# Patient Record
Sex: MALE | Race: WHITE | Employment: FULL TIME | URBAN - METROPOLITAN AREA
[De-identification: names, ages, dates, MRNs, and addresses within clinical notes are randomized per-mention and may not be internally consistent; named-entity substitution may affect disease eponyms.]

---

## 2017-01-30 DIAGNOSIS — F51.01 PRIMARY INSOMNIA: ICD-10-CM

## 2017-01-30 RX ORDER — TRAZODONE HYDROCHLORIDE 50 MG/1
TABLET ORAL
Qty: 90 TABLET | Refills: 3 | Status: SHIPPED | OUTPATIENT
Start: 2017-01-30 | End: 2018-01-27 | Stop reason: SDUPTHER

## 2017-09-20 ENCOUNTER — TELEPHONE (OUTPATIENT)
Dept: FAMILY MEDICINE CLINIC | Age: 52
End: 2017-09-20

## 2017-09-28 ENCOUNTER — TELEPHONE (OUTPATIENT)
Dept: FAMILY MEDICINE CLINIC | Age: 52
End: 2017-09-28

## 2017-09-28 DIAGNOSIS — Z00.00 WELL ADULT HEALTH CHECK: Primary | ICD-10-CM

## 2017-10-01 LAB
ALBUMIN SERPL-MCNC: 3.9 G/DL (ref 3.2–5.3)
ALK PHOSPHATASE: 77 IU/L (ref 35–121)
ALT SERPL-CCNC: 66 IU/L (ref 5–59)
ANION GAP SERPL CALCULATED.3IONS-SCNC: 12 MMOL/L
AST SERPL-CCNC: 48 IU/L (ref 10–42)
AVERAGE GLUCOSE: 120 MG/DL (ref 66–114)
BILIRUB SERPL-MCNC: 0.6 MG/DL (ref 0.2–1.3)
BUN BLDV-MCNC: 15 MG/DL (ref 10–20)
CALCIUM SERPL-MCNC: 9.3 MG/DL (ref 8.7–10.8)
CHLORIDE BLD-SCNC: 106 MMOL/L (ref 95–111)
CHOLESTEROL/HDL RATIO: 3.1
CHOLESTEROL: 180 MG/DL
CO2: 24 MMOL/L (ref 21–32)
CREAT SERPL-MCNC: 0.9 MG/DL (ref 0.5–1.3)
EGFR AFRICAN AMERICAN: 108
EGFR IF NONAFRICAN AMERICAN: 89
GLUCOSE: 105 MG/DL (ref 70–100)
HBA1C MFR BLD: 5.8 % (ref 4.2–5.8)
HDLC SERPL-MCNC: 59 MG/DL (ref 40–60)
LDL CHOLESTEROL CALCULATED: 107 MG/DL
LDL/HDL RATIO: 1.8
POTASSIUM SERPL-SCNC: 4.2 MMOL/L (ref 3.5–5.4)
SODIUM BLD-SCNC: 138 MMOL/L (ref 134–147)
TOTAL PROTEIN: 6.5 G/DL (ref 5.8–8)
TRIGL SERPL-MCNC: 70 MG/DL
VLDLC SERPL CALC-MCNC: 14 MG/DL

## 2017-10-03 ENCOUNTER — OFFICE VISIT (OUTPATIENT)
Dept: FAMILY MEDICINE CLINIC | Age: 52
End: 2017-10-03
Payer: COMMERCIAL

## 2017-10-03 ENCOUNTER — TELEPHONE (OUTPATIENT)
Dept: FAMILY MEDICINE CLINIC | Age: 52
End: 2017-10-03

## 2017-10-03 VITALS
HEART RATE: 84 BPM | WEIGHT: 315 LBS | SYSTOLIC BLOOD PRESSURE: 136 MMHG | HEIGHT: 72 IN | DIASTOLIC BLOOD PRESSURE: 84 MMHG | RESPIRATION RATE: 16 BRPM | BODY MASS INDEX: 42.66 KG/M2

## 2017-10-03 DIAGNOSIS — K75.81 NASH (NONALCOHOLIC STEATOHEPATITIS): ICD-10-CM

## 2017-10-03 DIAGNOSIS — R73.01 IFG (IMPAIRED FASTING GLUCOSE): ICD-10-CM

## 2017-10-03 DIAGNOSIS — Z99.89 OSA ON CPAP: ICD-10-CM

## 2017-10-03 DIAGNOSIS — E66.01 MORBID OBESITY, UNSPECIFIED OBESITY TYPE (HCC): ICD-10-CM

## 2017-10-03 DIAGNOSIS — E78.5 HYPERLIPIDEMIA WITH TARGET LDL LESS THAN 100: ICD-10-CM

## 2017-10-03 DIAGNOSIS — F51.01 PRIMARY INSOMNIA: Primary | ICD-10-CM

## 2017-10-03 DIAGNOSIS — G47.33 OSA ON CPAP: ICD-10-CM

## 2017-10-03 DIAGNOSIS — Z12.11 SCREEN FOR COLON CANCER: ICD-10-CM

## 2017-10-03 PROCEDURE — 99214 OFFICE O/P EST MOD 30 MIN: CPT | Performed by: FAMILY MEDICINE

## 2017-10-03 ASSESSMENT — ENCOUNTER SYMPTOMS
RESPIRATORY NEGATIVE: 1
GASTROINTESTINAL NEGATIVE: 1

## 2017-10-03 ASSESSMENT — PATIENT HEALTH QUESTIONNAIRE - PHQ9
2. FEELING DOWN, DEPRESSED OR HOPELESS: 0
1. LITTLE INTEREST OR PLEASURE IN DOING THINGS: 0
SUM OF ALL RESPONSES TO PHQ QUESTIONS 1-9: 0
SUM OF ALL RESPONSES TO PHQ9 QUESTIONS 1 & 2: 0

## 2017-10-03 NOTE — MR AVS SNAPSHOT
After Visit Summary             Clover Good   10/3/2017 11:00 AM   Office Visit    Description:  Male : 1965   Provider:  Deloise Schwab, DO   Department:  [id number]              Your Follow-Up and Future Appointments         Below is a list of your follow-up and future appointments. This may not be a complete list as you may have made appointments directly with providers that we are not aware of or your providers may have made some for you. Please call your providers to confirm appointments. It is important to keep your appointments. Please bring your current insurance card, photo ID, co-pay, and all medication bottles to your appointment. If self-pay, payment is expected at the time of service. Your To-Do List     Follow-Up    Return in about 1 year (around 10/3/2018) for IFG. Information from Your Visit        Department     Name Address Phone Fax    [facility] [phone] [facility] [address] [facility] [place] [phone]      You Were Seen for:         Comments    Primary insomnia   [[id number]]         Vital Signs     Blood Pressure Pulse Respirations Height Weight Body Mass Index    136/84 (Site: Left Arm, Position: Sitting, Cuff Size: Large Adult) 84 16 6' (1.829 m) 410 lb 9.6 oz (186.2 kg) 55.69 kg/m2    Smoking Status                   Never Smoker           Additional Information about your Body Mass Index (BMI)           Your BMI as listed above is considered obese (30 or more). BMI is an estimate of body fat, calculated from your height and weight. The higher your BMI, the greater your risk of heart disease, high blood pressure, type 2 diabetes, stroke, gallstones, arthritis, sleep apnea, and certain cancers. BMI is not perfect. It may overestimate body fat in athletes and people who are more muscular.   Even a small weight loss (between 5 and 10 percent of your current weight) by decreasing your calorie intake and becoming more physically active will help lower your risk of developing or worsening diseases associated with obesity. Learn more at: [employer].uk          Instructions    You may receive a survey about your visit with us today. The feedback from our patients helps us identify what is working well and where the service to all patients can be enhanced. Thank you! Today's Medication Changes          These changes are accurate as of: 10/3/17 11:24 AM.  If you have any questions, ask your nurse or doctor. STOP taking these medications           loratadine 10 MG capsule   Commonly known as:  CLARITIN   Stopped by:  Chris Donaldson, DO               Your Current Medications Are              traZODone (DESYREL) 50 MG tablet TAKE 1 TABLET BY MOUTH NIGHTLY. Allergies           No Known Allergies         Additional Information        Basic Information     Date Of Birth Sex Race Ethnicity Preferred Language    1965 Male White Non-/Non  English      Problem List as of 10/3/2017  Date Reviewed: 10/3/2017                CAMI on CPAP    Morbid obesity ([address]    Primary insomnia    IFG (impaired fasting glucose)    Hyperlipidemia with target LDL less than 100    Renal cyst      Your Goals as of 10/3/2017 at 11:24 AM              11/3/15       Exercise    Exercise 3x per week (30 min per time)   On track      Immunizations as of 10/3/2017     Name Date    Tdap (Boostrix, Adacel) 11/3/2015      Preventive Care        Date Due    HIV screening is recommended for all people regardless of risk factors  aged 15-65 years at least once (lifetime) who have never been HIV tested.  6/16/1982    Colonoscopy 6/16/2017    Yearly Flu Vaccine (1) 9/1/2018 (Originally 9/1/2017)    Diabetes Screening 9/30/2020    Cholesterol Screening 9/30/2022    Tetanus Combination Vaccine (2 - Td) 11/3/2025            [facility] Our records indicate that you have an active [employer] account. You can view your After Visit Summary by going to [email or web address] and logging in with your [employer] username and password. If you don't have a [employer] username and password but a parent or guardian has access to your record, the parent or guardian should login with their own [employer] username and password and access your record to view the After Visit Summary. Additional Information  If you have questions, please contact the physician practice where you receive care. Remember, [employer] is NOT to be used for urgent needs. For medical emergencies, dial 911. For questions regarding your [employer] account call [phone]. If you have a clinical question, please call your doctor's office.

## 2017-10-03 NOTE — TELEPHONE ENCOUNTER
Patient checked insurance and Cologuard is covered, patient would like to complete this testing for colonoscopy screening. Form complete placed with provider for review, sign and fax. Cologuard shipping information given and explained to patient, voiced understanding.

## 2017-10-03 NOTE — PATIENT INSTRUCTIONS
You may receive a survey about your visit with us today. The feedback from our patients helps us identify what is working well and where the service to all patients can be enhanced. Thank you!     Check insurance regarding coverage for Cologuard

## 2017-10-03 NOTE — PROGRESS NOTES
Subjective:      Patient ID: Francisca Bobo is a 50 y.o. male. HPI:    Chief Complaint   Patient presents with    Annual Exam     NEEDS COLONOSCOPY    Results     Annual eval.      Doing well overall. Sleeping well on the trazodone. Compliant with CPAP, 100%. Weight is up once again, has declined bariatric referral in the past.    Wt Readings from Last 3 Encounters:   10/03/17 (!) 410 lb 9.6 oz (186.2 kg)   10/18/16 (!) 407 lb (184.6 kg)   12/15/15 (!) 393 lb 3.2 oz (178.4 kg)     Due for colonoscopy. Denies GI issues at this time. No famhx. Patient Active Problem List   Diagnosis    CAMI on CPAP    Morbid obesity ([facility] [address]    IFG (impaired fasting glucose)    Hyperlipidemia with target LDL less than 100    Renal cyst    Primary insomnia     No past surgical history on file. Prior to Admission medications    Medication Sig Start Date End Date Taking? Authorizing Provider   traZODone (DESYREL) 50 MG tablet TAKE 1 TABLET BY MOUTH NIGHTLY. 1/30/17  Yes Tl Plants, DO         Review of Systems   Constitutional: Negative. HENT: Negative. Respiratory: Negative. Cardiovascular: Negative. Gastrointestinal: Negative. Musculoskeletal: Negative. All other systems reviewed and are negative. Objective:   Physical Exam   Constitutional: He is oriented to person, place, and time. He appears well-developed and well-nourished. HENT:   Head: Normocephalic and atraumatic. Right Ear: Tympanic membrane normal.   Left Ear: Tympanic membrane normal.   Mouth/Throat: Oropharynx is clear and moist and mucous membranes are normal.   Neck: Carotid bruit is not present. Cardiovascular: Normal rate, regular rhythm and normal heart sounds. No murmur heard. Pulmonary/Chest: Effort normal and breath sounds normal.   Abdominal: Soft. Bowel sounds are normal.   Musculoskeletal: He exhibits no edema. Neurological: He is alert and oriented to person, place, and time.    Skin: Skin is warm and dry.   Psychiatric: He has a normal mood and affect. His behavior is normal.   Nursing note and vitals reviewed. Assessment:      1. Primary insomnia     2. CAMI on CPAP     3. Morbid obesity, unspecified obesity type     4. IFG (impaired fasting glucose)     5. Hyperlipidemia with target LDL less than 100     6. KELLY (nonalcoholic steatohepatitis)             Plan:      -  Chronic medical problems stable  -  Continue current medications  -  Labs reviewed, look fine  -  Cologuard form completed and faxed  -  Discussed bariatric, pt declines at this time  -  Stressed exercise, wt loss  -  RTO annually    Fernando received counseling on the following healthy behaviors: nutrition and exercise    Patient given educational materials on Nutrition and Exercise    I have instructed Fernando to complete a self tracking handout on Weights and instructed them to bring it with them to his next appointment. Discussed use, benefit, and side effects of prescribed medications. Barriers to medication compliance addressed. All patient questions answered. Pt voiced understanding.

## 2017-10-05 ENCOUNTER — TELEPHONE (OUTPATIENT)
Dept: FAMILY MEDICINE CLINIC | Age: 52
End: 2017-10-05

## 2017-10-10 ENCOUNTER — TELEPHONE (OUTPATIENT)
Dept: FAMILY MEDICINE CLINIC | Age: 52
End: 2017-10-10

## 2017-11-03 ENCOUNTER — TELEPHONE (OUTPATIENT)
Dept: FAMILY MEDICINE CLINIC | Age: 52
End: 2017-11-03

## 2017-11-03 NOTE — TELEPHONE ENCOUNTER
11/3/17  Patient requesting results from Cologuard testing as per  My chart, they couldn't release.   Thanks/blm

## 2018-01-27 DIAGNOSIS — F51.01 PRIMARY INSOMNIA: ICD-10-CM

## 2018-01-29 RX ORDER — TRAZODONE HYDROCHLORIDE 50 MG/1
TABLET ORAL
Qty: 90 TABLET | Refills: 3 | Status: SHIPPED | OUTPATIENT
Start: 2018-01-29 | End: 2019-01-24 | Stop reason: SDUPTHER

## 2018-02-19 ENCOUNTER — TELEPHONE (OUTPATIENT)
Dept: FAMILY MEDICINE CLINIC | Age: 53
End: 2018-02-19

## 2018-02-20 ENCOUNTER — OFFICE VISIT (OUTPATIENT)
Dept: FAMILY MEDICINE CLINIC | Age: 53
End: 2018-02-20
Payer: COMMERCIAL

## 2018-02-20 VITALS
HEART RATE: 92 BPM | HEIGHT: 72 IN | OXYGEN SATURATION: 97 % | SYSTOLIC BLOOD PRESSURE: 138 MMHG | WEIGHT: 315 LBS | DIASTOLIC BLOOD PRESSURE: 72 MMHG | TEMPERATURE: 98.2 F | RESPIRATION RATE: 24 BRPM | BODY MASS INDEX: 42.66 KG/M2

## 2018-02-20 DIAGNOSIS — J01.90 ACUTE RHINOSINUSITIS: Primary | ICD-10-CM

## 2018-02-20 PROCEDURE — 99213 OFFICE O/P EST LOW 20 MIN: CPT | Performed by: FAMILY MEDICINE

## 2018-02-20 RX ORDER — AMOXICILLIN AND CLAVULANATE POTASSIUM 875; 125 MG/1; MG/1
1 TABLET, FILM COATED ORAL 2 TIMES DAILY
Qty: 14 TABLET | Refills: 0 | Status: SHIPPED | OUTPATIENT
Start: 2018-02-20 | End: 2018-02-27

## 2018-02-20 ASSESSMENT — ENCOUNTER SYMPTOMS
SINUS PRESSURE: 1
RESPIRATORY NEGATIVE: 1
GASTROINTESTINAL NEGATIVE: 1
SINUS PAIN: 1

## 2018-02-20 NOTE — PROGRESS NOTES
Visit Information    Have you changed or started any medications since your last visit including any over-the-counter medicines, vitamins, or herbal medicines? no   Are you having any side effects from any of your medications? -  no  Have you stopped taking any of your medications? Is so, why? -  no    Have you seen any other physician or provider since your last visit? No  Have you had any other diagnostic tests since your last visit? No  Have you been seen in the emergency room and/or had an admission to a hospital since we last saw you? No  Have you had your routine dental cleaning in the past 6 months? yes - Dr Jesse Carmona    Have you activated your [employer] account? If not, what are your barriers?  Yes     Patient Care Team:  Diane Chu DO as PCP - General    Medical History Review  Past Medical, Family, and Social History reviewed and does not contribute to the patient presenting condition    Health Maintenance   Topic Date Due    HIV screen  06/16/1982    Flu vaccine (1) 09/01/2018 (Originally 9/1/2017)    A1C test (Diabetic or Prediabetic)  09/30/2018    Colon cancer screen fecal DNA test (Cologuard)  10/19/2020    Lipid screen  09/30/2022    DTaP/Tdap/Td vaccine (2 - Td) 11/03/2025

## 2018-02-20 NOTE — PROGRESS NOTES
Subjective:      Patient ID: Richard Yost is a 50 y.o. male. HPI:    Chief Complaint   Patient presents with    Facial Pain     x 10-14 days     Pt here for sinus pressure and congestion for the last 2 weeks. Now settling in the left maxillary sinus. A lot of pressure and now swelling. No fevers. Pos teeth pain. No cough, does have ST. Nothing comes out when blows nose. Patient Active Problem List   Diagnosis    CAMI on CPAP    Morbid obesity ([address]    IFG (impaired fasting glucose)    Hyperlipidemia with target LDL less than 100    Renal cyst    Primary insomnia     History reviewed. No pertinent surgical history. Prior to Admission medications    Medication Sig Start Date End Date Taking? Authorizing Provider   traZODone (DESYREL) 50 MG tablet TAKE 1 TABLET BY MOUTH NIGHTLY. 1/29/18  Yes Reg Dariela, DO         Review of Systems   Constitutional: Negative. Negative for fever. HENT: Positive for congestion, sinus pain and sinus pressure. Respiratory: Negative. Cardiovascular: Negative. Gastrointestinal: Negative. Musculoskeletal: Negative. All other systems reviewed and are negative. Objective:   Physical Exam   Constitutional: He is oriented to person, place, and time. He appears well-developed and well-nourished. HENT:   Head: Normocephalic and atraumatic. Right Ear: A middle ear effusion is present. Left Ear: A middle ear effusion is present. Nose: Mucosal edema, rhinorrhea and septal deviation present. Left sinus exhibits maxillary sinus tenderness. Mouth/Throat: Oropharynx is clear and moist and mucous membranes are normal.   Cardiovascular: Normal rate, regular rhythm and normal heart sounds. No murmur heard. Pulmonary/Chest: Effort normal and breath sounds normal.   Abdominal: Soft. Bowel sounds are normal.   Musculoskeletal: He exhibits no edema. Neurological: He is alert and oriented to person, place, and time. Skin: Skin is warm and dry. Psychiatric: He has a normal mood and affect. His behavior is normal.   Nursing note and vitals reviewed. Assessment:      1.  Acute rhinosinusitis  amoxicillin-clavulanate (AUGMENTIN) 875-125 MG per tablet           Plan:      -  Orders above  -  OTC Mucinex-D and Afrin NS  -  RTO prn

## 2018-09-10 ENCOUNTER — TELEPHONE (OUTPATIENT)
Dept: FAMILY MEDICINE CLINIC | Age: 53
End: 2018-09-10

## 2018-09-10 DIAGNOSIS — E78.5 HYPERLIPIDEMIA WITH TARGET LDL LESS THAN 100: ICD-10-CM

## 2018-09-10 DIAGNOSIS — R73.01 IFG (IMPAIRED FASTING GLUCOSE): Primary | ICD-10-CM

## 2018-09-22 LAB
ALBUMIN SERPL-MCNC: 4.3 G/DL (ref 3.5–5.2)
ALK PHOSPHATASE: 81 U/L (ref 39–118)
ALT SERPL-CCNC: 74 U/L (ref 5–50)
ANION GAP SERPL CALCULATED.3IONS-SCNC: 14 MEQ/L (ref 10–19)
AST SERPL-CCNC: 73 U/L (ref 9–50)
AVERAGE GLUCOSE: 126 MG/DL (ref 66–114)
AVERAGE GLUCOSE: NORMAL
BILIRUB SERPL-MCNC: 0.6 MG/DL
BUN BLDV-MCNC: 18 MG/DL (ref 8–23)
CALCIUM SERPL-MCNC: 9.3 MG/DL (ref 8.5–10.5)
CHLORIDE BLD-SCNC: 105 MEQ/L (ref 95–107)
CHOLESTEROL, TOTAL: 181 MG/DL
CHOLESTEROL/HDL RATIO: 3.5
CHOLESTEROL/HDL RATIO: 3.5
CHOLESTEROL: 181 MG/DL
CO2: 21 MEQ/L (ref 19–31)
CREAT SERPL-MCNC: 1 MG/DL (ref 0.8–1.4)
EGFR AFRICAN AMERICAN: 100.6 ML/MIN/1.73 M2
EGFR IF NONAFRICAN AMERICAN: 86.8 ML/MIN/1.73 M2
GLUCOSE: 111 MG/DL (ref 70–99)
HBA1C MFR BLD: 6 %
HBA1C MFR BLD: 6 % (ref 4.2–5.8)
HDLC SERPL-MCNC: 51 MG/DL (ref 35–70)
HDLC SERPL-MCNC: 51.4 MG/DL
LDL CHOLESTEROL CALCULATED: 112 MG/DL
LDL CHOLESTEROL CALCULATED: 112 MG/DL (ref 0–160)
LDL/HDL RATIO: 2.2
POTASSIUM SERPL-SCNC: 4.1 MEQ/L (ref 3.5–5.4)
SODIUM BLD-SCNC: 140 MEQ/L (ref 135–146)
TOTAL PROTEIN: 7.1 G/DL (ref 6.1–8.3)
TRIGL SERPL-MCNC: 89 MG/DL
TRIGL SERPL-MCNC: 89 MG/DL
TSH SERPL DL<=0.05 MIU/L-ACNC: 1.04 UIU/ML
TSH SERPL DL<=0.05 MIU/L-ACNC: 1.04 UIU/ML (ref 0.4–4.1)
VLDLC SERPL CALC-MCNC: 18 MG/DL
VLDLC SERPL CALC-MCNC: 18 MG/DL

## 2018-09-25 ENCOUNTER — OFFICE VISIT (OUTPATIENT)
Dept: FAMILY MEDICINE CLINIC | Age: 53
End: 2018-09-25
Payer: COMMERCIAL

## 2018-09-25 VITALS
BODY MASS INDEX: 44.1 KG/M2 | WEIGHT: 315 LBS | DIASTOLIC BLOOD PRESSURE: 74 MMHG | SYSTOLIC BLOOD PRESSURE: 126 MMHG | HEART RATE: 55 BPM | HEIGHT: 71 IN | RESPIRATION RATE: 15 BRPM | OXYGEN SATURATION: 98 %

## 2018-09-25 DIAGNOSIS — M54.9 CHRONIC RIGHT-SIDED BACK PAIN, UNSPECIFIED BACK LOCATION: ICD-10-CM

## 2018-09-25 DIAGNOSIS — G47.33 OSA ON CPAP: ICD-10-CM

## 2018-09-25 DIAGNOSIS — R73.01 IFG (IMPAIRED FASTING GLUCOSE): Primary | ICD-10-CM

## 2018-09-25 DIAGNOSIS — M22.42 CHONDROMALACIA OF BOTH PATELLAE: ICD-10-CM

## 2018-09-25 DIAGNOSIS — F51.01 PRIMARY INSOMNIA: ICD-10-CM

## 2018-09-25 DIAGNOSIS — M22.41 CHONDROMALACIA OF BOTH PATELLAE: ICD-10-CM

## 2018-09-25 DIAGNOSIS — Z99.89 OSA ON CPAP: ICD-10-CM

## 2018-09-25 DIAGNOSIS — M21.42 PES PLANUS OF BOTH FEET: ICD-10-CM

## 2018-09-25 DIAGNOSIS — E78.5 HYPERLIPIDEMIA WITH TARGET LDL LESS THAN 100: ICD-10-CM

## 2018-09-25 DIAGNOSIS — G89.29 CHRONIC RIGHT-SIDED BACK PAIN, UNSPECIFIED BACK LOCATION: ICD-10-CM

## 2018-09-25 DIAGNOSIS — M21.41 PES PLANUS OF BOTH FEET: ICD-10-CM

## 2018-09-25 DIAGNOSIS — E66.01 MORBID OBESITY (HCC): ICD-10-CM

## 2018-09-25 PROCEDURE — 99214 OFFICE O/P EST MOD 30 MIN: CPT | Performed by: FAMILY MEDICINE

## 2018-09-25 RX ORDER — TRAZODONE HYDROCHLORIDE 50 MG/1
TABLET ORAL
Qty: 90 TABLET | Refills: 3 | Status: CANCELLED | OUTPATIENT
Start: 2018-09-25

## 2018-09-25 ASSESSMENT — ENCOUNTER SYMPTOMS
GASTROINTESTINAL NEGATIVE: 1
RESPIRATORY NEGATIVE: 1
BACK PAIN: 1

## 2018-09-25 ASSESSMENT — PATIENT HEALTH QUESTIONNAIRE - PHQ9
SUM OF ALL RESPONSES TO PHQ QUESTIONS 1-9: 0
SUM OF ALL RESPONSES TO PHQ9 QUESTIONS 1 & 2: 0
1. LITTLE INTEREST OR PLEASURE IN DOING THINGS: 0
2. FEELING DOWN, DEPRESSED OR HOPELESS: 0
SUM OF ALL RESPONSES TO PHQ QUESTIONS 1-9: 0

## 2018-09-25 NOTE — PATIENT INSTRUCTIONS
You may receive a survey about your visit with us today. The feedback from our patients helps us identify what is working well and where the service to all patients can be enhanced. Thank you! Patient Education        Patellofemoral Pain Syndrome: Care Instructions  Your Care Instructions  Patellofemoral pain syndrome is pain in the front of the knee. It is caused by overuse, weak thigh muscles (quadriceps), or a problem with the way the kneecap moves. Extra weight may also cause this syndrome. The patella is the kneecap, and the femur is the thighbone. Some people may have pain in the front of the knee from a condition called chondromalacia. In this problem, the underside of the knee cartilage wears down and frays. Cartilage is a rubbery tissue that cushions joints. In some cases, the kneecap does not move, or track, in a normal way. You may have knee pain when you run, walk down hills or steps, or do another activity. Sitting for a long time also can cause knee pain. Your knee pain may get better with medicines for pain and swelling and exercises to make your quadriceps stronger. Losing weight, if you need to, may also help with pain. Follow-up care is a key part of your treatment and safety. Be sure to make and go to all appointments, and call your doctor if you are having problems. It's also a good idea to know your test results and keep a list of the medicines you take. How can you care for yourself at home? · Ask your doctor if you can take an over-the-counter pain medicine, such as acetaminophen (Tylenol), ibuprofen (Advil, Motrin), or naproxen (Aleve). Be safe with medicines. Read and follow all instructions on the label. · Rest and protect your knee. Take a break from activities that cause pain, such as long periods of sitting or kneeling. · Put ice or a cold pack on your knee for 10 to 20 minutes after activity. Put a thin cloth between the ice and your skin.   · If your doctor recommends an elastic bandage, sleeve, or other type of support for your knee, wear it as directed. · If your knee is not swollen, you can put moist heat, a heating pad, or a warm cloth on your knee. After several days of rest, you can begin gentle exercise of your knee. · Reach and stay at a healthy weight. Being overweight puts stress on your knees. · Wear athletic shoes that offer good support, especially if you run. · Use shoe inserts, or orthotics, if they help reduce your knee pain. Many drugseasy2comply ([employer]) and shoe stores sell them. · See a physical therapist to learn more exercises and stretches to make your legs stronger. When should you call for help? Watch closely for changes in your health, and be sure to contact your doctor if:    · Your knee pain does not get better or gets worse. Where can you learn more? Go to https://Intcomexpe9+eb.Writer's Bloq. org and sign in to your [employer] account. Enter B345 in the [employer] box to learn more about \"Patellofemoral Pain Syndrome: Care Instructions. \"     If you do not have an account, please click on the \"Sign Up Now\" link. Current as of: November 29, 2017  Content Version: 11.7  © 0213-1539 [employer]. Care instructions adapted under license by [employer] ([facility]). If you have questions about a medical condition or this instruction, always ask your healthcare professional. [place] [address] any warranty or liability for your use of this information. Patient Education        Patellofemoral Pain Syndrome (Runner's Knee): Exercises  Your Care Instructions  Here are some examples of typical rehabilitation exercises for your condition. Start each exercise slowly. Ease off the exercise if you start to have pain. Your doctor or physical therapist will tell you when you can start these exercises and which ones will work best for you.   How to do the exercises  Calf wall stretch    1. Stand facing a wall with your hands on the wall at leg.  9. Hold the stretch for at least 15 to 30 seconds. Or even better, hold the stretch for 1 minute if you can. 10. Repeat 2 to 4 times. Quad sets    1. Sit with your affected leg straight and supported on the floor or a firm bed. Place a small, rolled-up towel under your affected knee. Your other leg should be bent, with that foot flat on the floor. 2. Tighten the thigh muscles of your affected leg by pressing the back of your knee down into the towel. 3. Hold for about 6 seconds, then rest for up to 10 seconds. 4. Repeat 8 to 12 times. Straight-leg raises to the front    1. Lie on your back with your good knee bent so that your foot rests flat on the floor. Your affected leg should be straight. Make sure that your low back has a normal curve. You should be able to slip your hand in between the floor and the small of your back, with your palm touching the floor and your back touching the back of your hand. 2. Tighten the thigh muscles in your affected leg by pressing the back of your knee flat down to the floor. Hold your knee straight. 3. Keeping the thigh muscles tight and your leg straight, lift your affected leg up so that your heel is about 12 inches off the floor. 4. Hold for about 6 seconds, then lower your leg slowly. Rest for up to 10 seconds between repetitions. 5. Repeat 8 to 12 times. Straight-leg raises to the back    1. Lie on your stomach, and lift your leg straight up behind you (toward the ceiling). 2. Lift your toes about 6 inches off the floor, hold for about 6 seconds, then lower slowly. 3. Do 8 to 12 repetitions. Wall slide with ball squeeze    1. Stand with your back against a wall and with your feet about shoulder-width apart. Your feet should be about 12 inches away from the wall. 2. Put a ball about the size of a soccer ball between your knees. Then slowly slide down the wall until your knees are bent about 20 to 30 degrees.   3. Tighten your thigh muscles by squeezing the ball between your knees. Hold that position for about 10 seconds, then stop squeezing. Rest for up to 10 seconds between repetitions. 4. Repeat 8 to 12 times. Follow-up care is a key part of your treatment and safety. Be sure to make and go to all appointments, and call your doctor if you are having problems. It's also a good idea to know your test results and keep a list of the medicines you take. Where can you learn more? Go to https://Silent Communication.HealthHiway. org and sign in to your [employer] account. Enter A404 in the [employer] box to learn more about \"Patellofemoral Pain Syndrome (Runner's Knee): Exercises. \"     If you do not have an account, please click on the \"Sign Up Now\" link. Current as of: November 29, 2017  Content Version: 11.7  © 4475-7431 [employer]. Care instructions adapted under license by [facility] ([facility]). If you have questions about a medical condition or this instruction, always ask your healthcare professional. [address] any warranty or liability for your use of this information.

## 2018-09-25 NOTE — PROGRESS NOTES
Subjective:      Patient ID: Claudine Olmstead is a 48 y.o. male. HPI:    Chief Complaint   Patient presents with    Annual Exam    Insomnia    Back Pain     mid back patient bent over to  object from floor felt a \"pop\" mid-back pain right side radiates into rib area present for 6 months     Knee Pain     right side twisted knee stepped on his cat 2 month ago    [employer] Other     DOT will be faxing form to fill out in regards to Trazodone use      Annual eval.  Some concerns today. Pt states that he recently injured his mid-back, all he did was twist around and felt a \"pop\". Pt did this 6 mos ago. Any movement bothers him, will swell up at times. Motrin with little relief. No other conservative treatment since then. Pt also c/o right knee pain for the last 2 weeks. Twisted and felt a \"pop\" at that time. Believes that the kneecap popped out but now back into place. Still has medial knee pain. Motrin helps. No swelling. No locking or giving out. BP well controlled. BP Readings from Last 3 Encounters:   09/25/18 126/74   02/20/18 138/72   10/03/17 136/84     Weight unfortunately is back up. Wt Readings from Last 3 Encounters:   09/25/18 (!) 402 lb 9.6 oz (182.6 kg)   02/20/18 (!) 391 lb 12.8 oz (177.7 kg)   10/03/17 (!) 410 lb 9.6 oz (186.2 kg)     Sleeping well with the trazodone. Hx of CAMI, 100% on CPAP. Here to review labs. LFTs elevated. No US. Hep screen in past neg. Patient Active Problem List   Diagnosis    CAMI on CPAP    Morbid obesity ([facility] [address]    IFG (impaired fasting glucose)    Hyperlipidemia with target LDL less than 100    Renal cyst    Primary insomnia     No past surgical history on file. Prior to Admission medications    Medication Sig Start Date End Date Taking? Authorizing Provider   traZODone (DESYREL) 50 MG tablet TAKE 1 TABLET BY MOUTH NIGHTLY. 1/29/18  Yes [place], DO         Review of Systems   Constitutional: Negative. HENT: Negative. Respiratory: Negative. Cardiovascular: Negative. Gastrointestinal: Negative. Musculoskeletal: Positive for arthralgias (right knee pain) and back pain. All other systems reviewed and are negative. Objective:   Physical Exam   Constitutional: He is oriented to person, place, and time. He appears well-developed and well-nourished. HENT:   Head: Normocephalic and atraumatic. Right Ear: Tympanic membrane normal.   Left Ear: Tympanic membrane normal.   Mouth/Throat: Oropharynx is clear and moist and mucous membranes are normal.   Neck: Carotid bruit is not present. Cardiovascular: Normal rate, regular rhythm and normal heart sounds. No murmur heard. Pulmonary/Chest: Effort normal and breath sounds normal.   Abdominal: Soft. Bowel sounds are normal.   Musculoskeletal: He exhibits no edema. Right knee: He exhibits abnormal patellar mobility. He exhibits normal range of motion, no swelling, no effusion and normal meniscus. No tenderness found. No medial joint line and no lateral joint line tenderness noted. Thoracic back: He exhibits tenderness and pain. He exhibits no bony tenderness and no spasm. Back:         Feet:    Neurological: He is alert and oriented to person, place, and time. Skin: Skin is warm and dry. Psychiatric: He has a normal mood and affect. His behavior is normal.   Nursing note and vitals reviewed. Assessment:       Diagnosis Orders   1. IFG (impaired fasting glucose)     2. Primary insomnia     3. Morbid obesity ([address]     4. CAMI on CPAP     5. Hyperlipidemia with target LDL less than 100     6. Chondromalacia of both patellae     7. Pes planus of both feet  External Referral To Podiatry   8.  Chronic right-sided back pain, unspecified back location             Plan:      -  Chronic medical problems stable  -  Continue current medications  -  Labs reviewed, look ok overall  -  LFTs once again elevated, hepatitis panel neg in past  -  KELLY likely, weight loss discussed  -  Also discussed bariatric surgery, declines  -  Home exercises for #6  -  Refer to Dr. Angelo Esqueda for #7  -  Motrin prn for #8  -  RTO annually        Vivica Patient, DO

## 2019-01-24 DIAGNOSIS — F51.01 PRIMARY INSOMNIA: ICD-10-CM

## 2019-01-24 RX ORDER — TRAZODONE HYDROCHLORIDE 50 MG/1
TABLET ORAL
Qty: 90 TABLET | Refills: 3 | Status: SHIPPED | OUTPATIENT
Start: 2019-01-24 | End: 2019-09-12

## 2019-09-04 ENCOUNTER — TELEPHONE (OUTPATIENT)
Dept: FAMILY MEDICINE CLINIC | Age: 54
End: 2019-09-04

## 2019-09-04 DIAGNOSIS — Z00.00 WELL ADULT HEALTH CHECK: Primary | ICD-10-CM

## 2019-09-12 ENCOUNTER — OFFICE VISIT (OUTPATIENT)
Dept: FAMILY MEDICINE CLINIC | Age: 54
End: 2019-09-12
Payer: COMMERCIAL

## 2019-09-12 VITALS
HEART RATE: 80 BPM | WEIGHT: 315 LBS | OXYGEN SATURATION: 98 % | HEIGHT: 72 IN | BODY MASS INDEX: 42.66 KG/M2 | DIASTOLIC BLOOD PRESSURE: 82 MMHG | RESPIRATION RATE: 16 BRPM | SYSTOLIC BLOOD PRESSURE: 132 MMHG

## 2019-09-12 DIAGNOSIS — Z00.00 WELL ADULT HEALTH CHECK: Primary | ICD-10-CM

## 2019-09-12 LAB
ABSOLUTE BASO #: 0.1 X10E9/L (ref 0–0.9)
ABSOLUTE EOS #: 0.2 X10E9/L (ref 0–0.4)
ABSOLUTE LYMPH #: 2.4 X10E9/L (ref 1–3.5)
ABSOLUTE MONO #: 0.6 X10E9/L (ref 0–0.9)
ABSOLUTE NEUT #: 3.3 X10E9/L (ref 1.5–6.6)
ALBUMIN SERPL-MCNC: 4 G/DL (ref 3.2–5.3)
ALK PHOSPHATASE: 92 U/L (ref 39–130)
ALT SERPL-CCNC: 68 U/L (ref 0–40)
ANION GAP SERPL CALCULATED.3IONS-SCNC: 13 MMOL/L (ref 4–12)
AST SERPL-CCNC: 57 U/L (ref 0–41)
AVERAGE GLUCOSE: 126 MG/DL
AVERAGE GLUCOSE: NORMAL
BASOPHILS RELATIVE PERCENT: 1 %
BILIRUB SERPL-MCNC: 1 MG/DL (ref 0.3–1.2)
BUN BLDV-MCNC: 18 MG/DL (ref 5–23)
CALCIUM SERPL-MCNC: 9.2 MG/DL (ref 8.5–10.5)
CHLORIDE BLD-SCNC: 106 MMOL/L (ref 98–109)
CHOLESTEROL, TOTAL: 195 MG/DL
CHOLESTEROL/HDL RATIO: 3.5 (ref 1–5)
CHOLESTEROL/HDL RATIO: NORMAL
CHOLESTEROL: 195 MG/DL (ref 150–200)
CO2: 24 MMOL/L (ref 22–32)
CREAT SERPL-MCNC: 1.01 MG/DL (ref 0.6–1.3)
EGFR AFRICAN AMERICAN: >60 ML/MIN/1.73SQ.M
EGFR IF NONAFRICAN AMERICAN: >60 ML/MIN/1.73SQ.M
EOSINOPHILS RELATIVE PERCENT: 2.8 %
GLUCOSE: 133 MG/DL (ref 65–99)
HBA1C MFR BLD: 6 %
HBA1C MFR BLD: 6 % (ref 4.4–6.4)
HCT VFR BLD CALC: 46.3 % (ref 39–49)
HDLC SERPL-MCNC: 55 MG/DL
HDLC SERPL-MCNC: 55 MG/DL (ref 35–70)
HEMOGLOBIN: 15.5 G/DL (ref 13.1–17.3)
LDL CHOLESTEROL CALCULATED: 122 MG/DL
LDL CHOLESTEROL CALCULATED: 122 MG/DL (ref 0–160)
LDL/HDL RATIO: 2.2
LYMPHOCYTE %: 36.9 %
MCH RBC QN AUTO: 29.3 PG (ref 27–35)
MCHC RBC AUTO-ENTMCNC: 33.4 G/DL (ref 32–36)
MCV RBC AUTO: 88 FL (ref 81–101)
MONOCYTES # BLD: 9 %
NEUTROPHILS RELATIVE PERCENT: 50.3 %
PDW BLD-RTO: 14.2 % (ref 11.4–14.3)
PLATELETS: 185 X10E9/L (ref 150–450)
PMV BLD AUTO: 10 FL (ref 7–12)
POTASSIUM SERPL-SCNC: 3.8 MMOL/L (ref 3.5–5)
PSA, ULTRASENSITIVE: 0.56 NG/ML (ref 0–4)
RBC: 5.28 X10E12/L (ref 4.25–5.65)
SODIUM BLD-SCNC: 143 MMOL/L (ref 134–146)
TOTAL PROTEIN: 6.5 G/DL (ref 6–8)
TRIGL SERPL-MCNC: 89 MG/DL
TRIGL SERPL-MCNC: 89 MG/DL (ref 27–150)
TSH SERPL DL<=0.05 MIU/L-ACNC: 1.65 UIU/ML
TSH SERPL DL<=0.05 MIU/L-ACNC: 1.65 UIU/ML (ref 0.49–4.67)
VLDLC SERPL CALC-MCNC: 18 MG/DL (ref 0–30)
VLDLC SERPL CALC-MCNC: NORMAL MG/DL
WBC: 6.6 X10E9/L (ref 4.8–10.8)

## 2019-09-12 PROCEDURE — 99396 PREV VISIT EST AGE 40-64: CPT | Performed by: FAMILY MEDICINE

## 2019-09-12 ASSESSMENT — PATIENT HEALTH QUESTIONNAIRE - PHQ9
1. LITTLE INTEREST OR PLEASURE IN DOING THINGS: 0
2. FEELING DOWN, DEPRESSED OR HOPELESS: 0
SUM OF ALL RESPONSES TO PHQ QUESTIONS 1-9: 0
SUM OF ALL RESPONSES TO PHQ9 QUESTIONS 1 & 2: 0
SUM OF ALL RESPONSES TO PHQ QUESTIONS 1-9: 0

## 2019-09-12 ASSESSMENT — ENCOUNTER SYMPTOMS
GASTROINTESTINAL NEGATIVE: 1
RESPIRATORY NEGATIVE: 1

## 2020-08-11 ENCOUNTER — TELEPHONE (OUTPATIENT)
Dept: FAMILY MEDICINE CLINIC | Age: 55
End: 2020-08-11

## 2020-08-11 NOTE — TELEPHONE ENCOUNTER
Pt is scheduled 09/11 for a yearly physical. Pt would like to have labs done prior to appointment. Please advise.

## 2020-08-11 NOTE — TELEPHONE ENCOUNTER
Pt was notified and lab orders were faxed to [facility] [employer] per request.

## 2020-09-10 LAB
ABSOLUTE BASO #: 0 X10E9/L (ref 0–0.2)
ABSOLUTE EOS #: 0.2 X10E9/L (ref 0–0.4)
ABSOLUTE LYMPH #: 3.1 X10E9/L (ref 1–3.5)
ABSOLUTE MONO #: 0.6 X10E9/L (ref 0–0.9)
ABSOLUTE NEUT #: 2.8 X10E9/L (ref 1.5–6.6)
ALBUMIN SERPL-MCNC: 4 G/DL (ref 3.2–5.3)
ALK PHOSPHATASE: 85 U/L (ref 39–130)
ALT SERPL-CCNC: 60 U/L (ref 0–40)
ANION GAP SERPL CALCULATED.3IONS-SCNC: 9 MMOL/L (ref 5–15)
AST SERPL-CCNC: 59 U/L (ref 0–41)
AVERAGE GLUCOSE: 128 MG/DL
BASOPHILS RELATIVE PERCENT: 0.5 %
BILIRUB SERPL-MCNC: 0.7 MG/DL (ref 0.3–1.2)
BUN BLDV-MCNC: 22 MG/DL (ref 5–23)
CALCIUM SERPL-MCNC: 9.3 MG/DL (ref 8.5–10.5)
CHLORIDE BLD-SCNC: 108 MMOL/L (ref 98–109)
CHOLESTEROL/HDL RATIO: 4.1 (ref 1–5)
CHOLESTEROL: 196 MG/DL (ref 150–200)
CO2: 23 MMOL/L (ref 22–32)
CREAT SERPL-MCNC: 1.02 MG/DL (ref 0.6–1.3)
EGFR AFRICAN AMERICAN: >60 ML/MIN/1.73SQ.M
EGFR IF NONAFRICAN AMERICAN: >60 ML/MIN/1.73SQ.M
EOSINOPHILS RELATIVE PERCENT: 3.3 %
GLUCOSE: 140 MG/DL (ref 65–99)
HBA1C MFR BLD: 6.1 % (ref 4.4–6.4)
HCT VFR BLD CALC: 46.7 % (ref 39–49)
HDLC SERPL-MCNC: 48 MG/DL
HEMOGLOBIN: 15.1 G/DL (ref 13–17)
LDL CHOLESTEROL CALCULATED: 129 MG/DL
LDL/HDL RATIO: 2.7
LYMPHOCYTE %: 46.1 %
MCH RBC QN AUTO: 28.6 PG (ref 27–34)
MCHC RBC AUTO-ENTMCNC: 32.3 G/DL (ref 32–36)
MCV RBC AUTO: 89 FL (ref 80–100)
MONOCYTES # BLD: 8.8 %
NEUTROPHILS RELATIVE PERCENT: 41.3 %
PDW BLD-RTO: 13.5 % (ref 11.5–15)
PLATELETS: 161 X10E9/L (ref 150–450)
PMV BLD AUTO: 10.7 FL (ref 7–12)
POTASSIUM SERPL-SCNC: 3.9 MMOL/L (ref 3.5–5)
PSA, ULTRASENSITIVE: 0.74 NG/ML (ref 0–4)
RBC: 5.27 X10E12/L (ref 4.1–5.7)
SODIUM BLD-SCNC: 140 MMOL/L (ref 134–146)
TOTAL PROTEIN: 7.2 G/DL (ref 6–8)
TRIGL SERPL-MCNC: 96 MG/DL (ref 27–150)
TSH SERPL DL<=0.05 MIU/L-ACNC: 2.36 UIU/ML (ref 0.49–4.67)
VLDLC SERPL CALC-MCNC: 19 MG/DL (ref 0–30)
WBC: 6.8 X10E9/L (ref 4–11)

## 2020-09-11 ENCOUNTER — OFFICE VISIT (OUTPATIENT)
Dept: FAMILY MEDICINE CLINIC | Age: 55
End: 2020-09-11
Payer: COMMERCIAL

## 2020-09-11 VITALS
WEIGHT: 315 LBS | SYSTOLIC BLOOD PRESSURE: 116 MMHG | BODY MASS INDEX: 55.88 KG/M2 | TEMPERATURE: 97.2 F | HEART RATE: 68 BPM | RESPIRATION RATE: 22 BRPM | DIASTOLIC BLOOD PRESSURE: 84 MMHG

## 2020-09-11 PROCEDURE — G8427 DOCREV CUR MEDS BY ELIG CLIN: HCPCS | Performed by: FAMILY MEDICINE

## 2020-09-11 PROCEDURE — 99214 OFFICE O/P EST MOD 30 MIN: CPT | Performed by: FAMILY MEDICINE

## 2020-09-11 PROCEDURE — 3017F COLORECTAL CA SCREEN DOC REV: CPT | Performed by: FAMILY MEDICINE

## 2020-09-11 PROCEDURE — 1036F TOBACCO NON-USER: CPT | Performed by: FAMILY MEDICINE

## 2020-09-11 PROCEDURE — G8417 CALC BMI ABV UP PARAM F/U: HCPCS | Performed by: FAMILY MEDICINE

## 2020-09-11 RX ORDER — NAPROXEN 500 MG/1
500 TABLET ORAL 2 TIMES DAILY WITH MEALS
Qty: 60 TABLET | Refills: 2 | Status: SHIPPED | OUTPATIENT
Start: 2020-09-11 | End: 2020-12-17

## 2020-09-11 SDOH — ECONOMIC STABILITY: TRANSPORTATION INSECURITY
IN THE PAST 12 MONTHS, HAS LACK OF TRANSPORTATION KEPT YOU FROM MEETINGS, WORK, OR FROM GETTING THINGS NEEDED FOR DAILY LIVING?: NO

## 2020-09-11 SDOH — ECONOMIC STABILITY: FOOD INSECURITY: WITHIN THE PAST 12 MONTHS, THE FOOD YOU BOUGHT JUST DIDN'T LAST AND YOU DIDN'T HAVE MONEY TO GET MORE.: NEVER TRUE

## 2020-09-11 SDOH — ECONOMIC STABILITY: FOOD INSECURITY: WITHIN THE PAST 12 MONTHS, YOU WORRIED THAT YOUR FOOD WOULD RUN OUT BEFORE YOU GOT MONEY TO BUY MORE.: NEVER TRUE

## 2020-09-11 SDOH — ECONOMIC STABILITY: INCOME INSECURITY: HOW HARD IS IT FOR YOU TO PAY FOR THE VERY BASICS LIKE FOOD, HOUSING, MEDICAL CARE, AND HEATING?: NOT HARD AT ALL

## 2020-09-11 SDOH — ECONOMIC STABILITY: TRANSPORTATION INSECURITY
IN THE PAST 12 MONTHS, HAS THE LACK OF TRANSPORTATION KEPT YOU FROM MEDICAL APPOINTMENTS OR FROM GETTING MEDICATIONS?: NO

## 2020-09-11 ASSESSMENT — PATIENT HEALTH QUESTIONNAIRE - PHQ9
1. LITTLE INTEREST OR PLEASURE IN DOING THINGS: 0
SUM OF ALL RESPONSES TO PHQ9 QUESTIONS 1 & 2: 0
2. FEELING DOWN, DEPRESSED OR HOPELESS: 0
SUM OF ALL RESPONSES TO PHQ QUESTIONS 1-9: 0
SUM OF ALL RESPONSES TO PHQ QUESTIONS 1-9: 0

## 2020-09-11 NOTE — PROGRESS NOTES
Visit Information    Have you changed or started any medications since your last visit including any over-the-counter medicines, vitamins, or herbal medicines? no   Are you having any side effects from any of your medications? -  no  Have you stopped taking any of your medications? Is so, why? -  no    Have you seen any other physician or provider since your last visit? Yes - Records Requested  Have you had any other diagnostic tests since your last visit? Yes - Records Obtained  Have you been seen in the emergency room and/or had an admission to a hospital since we last saw you? No  Have you had your routine dental cleaning in the past 6 months? yes - 06/2020    Have you activated your [employer] account? If not, what are your barriers?  Yes     Patient Care Team:  Shikha Smith DO as PCP - [facility]  Shikha Smith DO as PCP - [facility] Provider    Medical History Review  Past Medical, Family, and Social History reviewed and does not contribute to the patient presenting condition    Health Maintenance   Topic Date Due    HIV screen  06/16/1982    Shingles Vaccine (1 of 2) 06/16/2017    Flu vaccine (1) 09/01/2020    Colon cancer screen fecal DNA test (Cologuard)  10/19/2020    A1C test (Diabetic or Prediabetic)  09/10/2021    Lipid screen  09/10/2025    DTaP/Tdap/Td vaccine (8 - Td) 11/03/2025    Hepatitis A vaccine  Aged Out    Hepatitis B vaccine  Aged Out    Hib vaccine  Aged Out    Meningococcal (ACWY) vaccine  Aged Out    Pneumococcal 0-64 years Vaccine  Aged Out

## 2020-09-11 NOTE — PROGRESS NOTES
Subjective:      Patient ID: Mandy Zepeda is a 50 y.o. male. HPI:    Chief Complaint   Patient presents with    Knee Pain     right    Lower Back Pain    Discuss Labs     Pt here with a few concerns today. Pt was in the garage a few weeks ago and injured his right knee and low back. Pt twisted the right knee and has pain since. There is some swelling in the knee. There is also some locking and giving out. Taking Motrin with some relief. In regards to the back, the pain is chronic in nature. Pain is located in the right flank. Does admit to some RUQ abdominal pain at times, not sure if associated to foods or not. Patient Active Problem List   Diagnosis    CAMI on CPAP    Morbid obesity ([facility] [address]    IFG (impaired fasting glucose)    Hyperlipidemia with target LDL less than 100    Renal cyst    Primary insomnia     No past surgical history on file. Prior to Admission medications    Not on File         Review of Systems   Constitutional: Negative. HENT: Negative. Respiratory: Negative. Cardiovascular: Negative. Gastrointestinal: Positive for abdominal pain. Musculoskeletal: Positive for arthralgias (right knee pain), back pain, gait problem and joint swelling. All other systems reviewed and are negative. Objective:   Physical Exam  Vitals signs and nursing note reviewed. Constitutional:       Appearance: He is well-developed. HENT:      Head: Normocephalic and atraumatic. Right Ear: Tympanic membrane normal.      Left Ear: Tympanic membrane normal.   Cardiovascular:      Rate and Rhythm: Normal rate and regular rhythm. Heart sounds: Normal heart sounds. No murmur. Pulmonary:      Effort: Pulmonary effort is normal.      Breath sounds: Normal breath sounds. Abdominal:      General: Bowel sounds are normal.      Palpations: Abdomen is soft. Tenderness: There is abdominal tenderness in the right upper quadrant. Positive signs include Monsivais's sign. Musculoskeletal:      Right knee: He exhibits decreased range of motion, swelling and effusion. Tenderness found. Medial joint line and patellar tendon tenderness noted. Thoracic back: He exhibits tenderness. Back:    Skin:     General: Skin is warm and dry. Neurological:      Mental Status: He is alert and oriented to person, place, and time. Psychiatric:         Behavior: Behavior normal.         Assessment:       Diagnosis Orders   1. Acute pain of right knee  naproxen (NAPROSYN) 500 MG tablet   2. Effusion, right knee     3. Internal derangement of knee, right     4. Right flank pain  XR ABDOMEN (KUB) (SINGLE AP VIEW)   5. Colicky RUQ abdominal pain  US GALLBLADDER RUQ    XR ABDOMEN (KUB) (SINGLE AP VIEW)   6. Elevated LFTs  US GALLBLADDER RUQ   7. Right-sided chest wall pain             Plan:      -  Multiple concerns toady, each addressed at length  -  Orders above  -  Rest, ice, naproxen for #2.   Meniscal injury likely  -  RTO prn for now, further recommendations after above testing        Janee Dozier DO

## 2020-09-12 ASSESSMENT — ENCOUNTER SYMPTOMS
RESPIRATORY NEGATIVE: 1
BACK PAIN: 1
ABDOMINAL PAIN: 1

## 2020-10-06 ENCOUNTER — HOSPITAL ENCOUNTER (OUTPATIENT)
Dept: GENERAL RADIOLOGY | Age: 55
Discharge: HOME | End: 2020-10-06
Payer: COMMERCIAL

## 2020-10-06 ENCOUNTER — HOSPITAL ENCOUNTER (OUTPATIENT)
Dept: ULTRASOUND IMAGING | Age: 55
Discharge: HOME | End: 2020-10-06
Payer: COMMERCIAL

## 2020-10-06 ENCOUNTER — TELEPHONE (OUTPATIENT)
Dept: FAMILY MEDICINE CLINIC | Age: 55
End: 2020-10-06

## 2020-10-06 ENCOUNTER — HOSPITAL ENCOUNTER (OUTPATIENT)
Age: 55
Discharge: HOME | End: 2020-10-06
Payer: COMMERCIAL

## 2020-10-06 PROCEDURE — 76705 ECHO EXAM OF ABDOMEN: CPT

## 2020-10-06 PROCEDURE — 74018 RADEX ABDOMEN 1 VIEW: CPT

## 2020-10-06 NOTE — TELEPHONE ENCOUNTER
Pt called office stating he saw on NERITES that he is due for a Cologuard. Pt is asking for an order for this. He is aware we will fax the order into the company and they will ship him the Cologuard kit. Please advise.

## 2020-10-12 NOTE — PROGRESS NOTES
[facility]. [facility] [address]. [address]  [facility] [address]  [phone]  New Patient Evaluation in Office    Pt Name: Sathish Hernandez. Date of Birth 1965   Today's Date: 10/13/2020  Medical Record Number: [id number]  Referring Provider: Mauricio Cordero DO  Primary Care Provider: Roz Ford DO  Chief Complaint   Patient presents with   [facility] Surgical Consult     new patient- ref Dr. [place]- gallbladder- US 10/6     ASSESSMENT       Diagnosis Orders   1. Chronic cholecystitis with calculus  LAPAROSCOPY CHOLECYSTECTOMY    Covid-19 Ambulatory   2. Pre-op testing  Covid-19 Ambulatory     Past Medical History:   Diagnosis Date    History of meniscal tear     right knee-takes Naproxen    Obesity     Seasonal allergies           PLANS      1. Schedule Ray for L/S cholecystectomy possible open  2. The risks, options and alternatives were discussed at length with the patient. The risks including bleeding, infection, reoperation, bile duct injury and possible conversion to an open procedure were covered as well as nonresolution of pain. The possibility of other unforeseen complications including bile leak were also mentioned. Patient was made aware of intraoperative complications such as other organ injury or injury to surrounding structures. We also discussed the conduct of the operation, probable outpatient stay postoperatively and the typical post operative recovery and restrictions. The patients questions were answered. After this discussion, the patient would like to proceed with cholecystectomy. 3. Status: outpatient  4. Planned anesthesia: general  5. He will undergo pre-operative clearance per anesthesia guidelines with risk factors listed under the past medical history diagnosis & problem list.     Radha Lewis is a 50 y.o. male seen in the consultation for evaluation regarding gallbladder.  Onset of symptoms was gradual 3 years ago with worsened, beginning several months ago. The symptoms have included right upper quadrant pain, pain radiating to the back and nausea and/or vomiting after meals. Fernando describes the pain as aching and pressure-like, recurrent and moderate in severity. His symptoms are worse with fatty foods and improved with avoiding these foods. He denies any history of pancreatitis, jaundice, pancreatitis or ulcer disease. Symptoms have been unrelieved by antacids. I have personally reviewed the following films:  US performed on 10/6/20 which showed gallstones, no wall thickening or ductal dilation. Past Medical History  Past Medical History:   Diagnosis Date    History of meniscal tear     right knee-takes Naproxen    Obesity     Seasonal allergies      Past Surgical History  Past Surgical History:   Procedure Laterality Date    OTHER SURGICAL HISTORY  2010    left hematoma lower-I&D Dr Giordano Gains EXTRACTION  90's     Medications  Current Outpatient Medications   Medication Sig Dispense Refill    naproxen (NAPROSYN) 500 MG tablet Take 1 tablet by mouth 2 times daily (with meals) 60 tablet 2     No current facility-administered medications for this visit. Allergies  is allergic to no known allergies. Family History  family history includes COPD in his father and mother; Cancer in his paternal grandmother; Heart Disease in his father; Hypertension in his father; No Known Problems in his maternal grandmother and paternal grandfather; Other in his maternal grandfather. Social History   reports that he has never smoked. He has never used smokeless tobacco. He reports that he does not drink alcohol or use drugs.   Health Screening Exams  Health Maintenance   Topic Date Due    HIV screen  06/16/1982    Shingles Vaccine (1 of 2) 06/16/2017    Flu vaccine (1) 09/01/2020    Colon cancer screen fecal DNA test (Cologuard)  10/19/2020    A1C test (Diabetic or Prediabetic)  09/10/2021    Lipid screen 09/10/2025    DTaP/Tdap/Td vaccine (8 - Td) 11/03/2025    Hepatitis A vaccine  Aged Out    Hepatitis B vaccine  Aged Out    Hib vaccine  Aged Out    Meningococcal (ACWY) vaccine  Aged Out    Pneumococcal 0-64 years Vaccine  Aged Out     Review of Systems  Constitutional: Negative for activity change, appetite change, chills, diaphoresis, fatigue, fever and unexpected weight change. HENT: Negative for congestion, dental problem, drooling, ear discharge, ear pain, facial swelling, hearing loss, mouth sores, nosebleeds, postnasal drip, rhinorrhea, sinus pressure, sneezing, sore throat, tinnitus, trouble swallowing and voice change. Eyes: Negative for photophobia, pain, discharge, redness, itching and visual disturbance. Respiratory: Negative for apnea, cough, choking, chest tightness, shortness of breath, wheezing and stridor. Cardiovascular: Negative for chest pain, palpitations and leg swelling. Gastrointestinal: Positive for abdominal pain (right side abd pain-shoulder pain), constipation, diarrhea, nausea and vomiting. Negative for abdominal distention, anal bleeding, blood in stool and rectal pain. Endocrine: Negative for cold intolerance, heat intolerance, polydipsia, polyphagia and polyuria. Genitourinary: Negative for decreased urine volume, difficulty urinating, dysuria, enuresis, flank pain, frequency, genital sores, hematuria and urgency. Musculoskeletal: Negative for arthralgias, back pain, gait problem, joint swelling, myalgias, neck pain and neck stiffness. Skin: Negative for color change, pallor, rash and wound. Allergic/Immunologic: Negative for environmental allergies, food allergies and immunocompromised state. Neurological: Negative for dizziness, tremors, seizures, syncope, facial asymmetry, speech difficulty, weakness, light-headedness, numbness and headaches. Hematological: Negative for adenopathy. Does not bruise/bleed easily.    Psychiatric/Behavioral: Negative for agitation, behavioral problems, confusion, decreased concentration, dysphoric mood, hallucinations, self-injury, sleep disturbance and suicidal ideas. The patient is not nervous/anxious and is not hyperactive. OBJECTIVE      VITALS:  height is 6' (1.829 m) and weight is 418 lb 3.2 oz (189.7 kg) (abnormal). His temporal temperature is 96.7 °F (35.9 °C). His blood pressure is 130/62 and his pulse is 81. His respiration is 18 and oxygen saturation is 91%. Pain Score:   0 - No pain Body mass index is 56.72 kg/m². CONSTITUTIONAL: Alert and oriented times 3, no acute distress and cooperative to examination with proper mood and affect. SKIN: Skin color, texture, turgor normal. No rashes or lesions. LYMPH: no cervical nodes, no inguinal nodes  HEENT: Head is normocephalic, atraumatic. EOMI, PERRLA. NECK: Supple, symmetrical, trachea midline, no adenopathy, thyroid symmetric, not enlarged and no tenderness, skin normal.  CHEST/LUNGS: chest symmetric with normal A/P diameter, normal respiratory rate and rhythm, lungs clear to auscultation without wheezes, rales or rhonchi. No accessory muscle use. Scars None   CARDIOVASCULAR: Heart sounds are normal.  Regular rate and rhythm without murmur, gallop or rub. Normal S1 and S2. Carotid and femoral pulses 2+/4 and equal bilaterally. ABDOMEN: morbidly obese No scar(s) present. Normal bowel sounds. No bruits. soft, nondistended, no masses or organomegaly. no evidence of hernia. Percussion: Normal without hepatosplenomegally. Gallbladder is nonpalpable and is tender. RECTAL: deferred, not clinically indicated  NEUROLOGIC: There are no focalizing motor or sensory deficits. CN II-XII are grossly intact. Mya Ku EXTREMITIES: no cyanosis, no clubbing and no edema. Thank you for the interesting evaluation. Further recommendations as listed above.        Electronically signed by Kimmy Menendez DO on 10/13/2020 at 12:18 PM

## 2020-10-13 ENCOUNTER — OFFICE VISIT (OUTPATIENT)
Dept: SURGERY | Age: 55
End: 2020-10-13
Payer: COMMERCIAL

## 2020-10-13 VITALS
SYSTOLIC BLOOD PRESSURE: 130 MMHG | WEIGHT: 315 LBS | DIASTOLIC BLOOD PRESSURE: 62 MMHG | HEIGHT: 72 IN | RESPIRATION RATE: 18 BRPM | OXYGEN SATURATION: 91 % | BODY MASS INDEX: 42.66 KG/M2 | TEMPERATURE: 96.7 F | HEART RATE: 81 BPM

## 2020-10-13 PROCEDURE — G8484 FLU IMMUNIZE NO ADMIN: HCPCS | Performed by: SURGERY

## 2020-10-13 PROCEDURE — G8417 CALC BMI ABV UP PARAM F/U: HCPCS | Performed by: SURGERY

## 2020-10-13 PROCEDURE — G8427 DOCREV CUR MEDS BY ELIG CLIN: HCPCS | Performed by: SURGERY

## 2020-10-13 PROCEDURE — 99204 OFFICE O/P NEW MOD 45 MIN: CPT | Performed by: SURGERY

## 2020-10-13 ASSESSMENT — ENCOUNTER SYMPTOMS
NAUSEA: 1
CONSTIPATION: 1
EYE DISCHARGE: 0
EYE ITCHING: 0
EYE PAIN: 0
VOMITING: 1
CHEST TIGHTNESS: 0
TROUBLE SWALLOWING: 0
DIARRHEA: 1
EYE REDNESS: 0
CHOKING: 0
PHOTOPHOBIA: 0
FACIAL SWELLING: 0
BACK PAIN: 0
RHINORRHEA: 0
WHEEZING: 0
APNEA: 0
VOICE CHANGE: 0
RECTAL PAIN: 0
ABDOMINAL PAIN: 1
SORE THROAT: 0
SINUS PRESSURE: 0
ABDOMINAL DISTENTION: 0
BLOOD IN STOOL: 0
STRIDOR: 0
SHORTNESS OF BREATH: 0
COLOR CHANGE: 0
ANAL BLEEDING: 0
COUGH: 0

## 2020-10-13 NOTE — LETTER
Chanel Yepez DO  [address] [address]. [address]  [place] [address]  [phone]     Pt Name: Prieto Avila Record Number: [id number]  Date of Birth 1965   Today's Date: 10/13/2020    Gabriella Mays was seen in consultation in the office today. My assessment and plans are listed below. ASSESSMENT      Diagnosis Orders   1. Chronic cholecystitis with calculus  LAPAROSCOPY CHOLECYSTECTOMY    Covid-19 Ambulatory   2. Pre-op testing  Covid-19 Ambulatory     Past Medical History:   Diagnosis Date    History of meniscal tear     right knee-takes Naproxen    Obesity     Seasonal allergies          PLANS:   1. Schedule Ray for L/S cholecystectomy possible open  2. The risks, options and alternatives were discussed at length with the patient. The risks including bleeding, infection, reoperation, bile duct injury and possible conversion to an open procedure were covered as well as nonresolution of pain. The possibility of other unforeseen complications including bile leak were also mentioned. Patient was made aware of intraoperative complications such as other organ injury or injury to surrounding structures. We also discussed the conduct of the operation, probable outpatient stay postoperatively and the typical post operative recovery and restrictions. The patients questions were answered. After this discussion, the patient would like to proceed with cholecystectomy. 3. Status: outpatient  4. Planned anesthesia: general  5. He will undergo pre-operative clearance per anesthesia guidelines with risk factors listed under the past medical history diagnosis & problem list.    If I can provide any additional assistance or you have any concerns, please feel free to contact me. Thank you for allowing to participate in the care of your patients. Sincerely,      Marco Miller.  Jaren Spence

## 2020-10-13 NOTE — PATIENT INSTRUCTIONS
Patient Education        Gallbladder Removal: Before Your Surgery  What is gallbladder removal surgery? Gallbladder surgery removes a diseased gallbladder. It is also known as cholecystectomy ([id number]-vjp-llp-JACQUELIN-tuh-tahmina). This surgery is usually done as a laparoscopic surgery. The doctor puts a lighted tube and other surgical tools through small cuts (incisions) in your belly. The tube is called a scope. It lets your doctor see your organs so your doctor can do the surgery. The incisions leave scars that fade with time. Most people go home the same day. You probably will feel better each day. Most people have only a small amount of pain after 1 week. If you have a desk job, you can probably go back to work in 1 to 2 weeks. If you lift heavy objects or have a very active job, it may take up to 4 weeks. In some cases, open surgery is the best choice. Your doctor may choose open surgery in advance. Or the doctor may choose it in the middle of laparoscopic surgery. In open surgery, the doctor makes a larger incision in your upper belly. If you have open surgery, you will probably stay in the hospital for 2 to 4 days. And it may take 4 to 6 weeks to get back to your normal routine. Follow-up care is a key part of your treatment and safety. Be sure to make and go to all appointments, and call your doctor if you are having problems. It's also a good idea to know your test results and keep a list of the medicines you take. How do you prepare for surgery? Surgery can be stressful. This information will help you understand what you can expect. And it will help you safely prepare for surgery. Preparing for surgery    · You may need to empty your colon with an enema or laxative. Your doctor will tell you how to do this.     · Be sure you have someone to take you home.  Anesthesia and pain medicine will make it unsafe for you to drive or get home on your own.     · Understand exactly what surgery is planned, along with the risks, benefits, and other options.     · If you take aspirin or some other blood thinner, ask your doctor if you should stop taking it before your surgery. Make sure that you understand exactly what your doctor wants you to do. These medicines increase the risk of bleeding.     · Tell your doctor ALL the medicines, vitamins, supplements, and herbal remedies you take. Some may increase the risk of problems during your surgery. Your doctor will tell you if you should stop taking any of them before the surgery and how soon to do it.     · Make sure your doctor and the hospital have a copy of your advance directive. If you don't have one, you may want to prepare one. It lets others know your health care wishes. It's a good thing to have before any type of surgery or procedure. What happens on the day of surgery? · Follow the instructions exactly about when to stop eating and drinking. If you don't, your surgery may be canceled. If your doctor told you to take your medicines on the day of surgery, take them with only a sip of water.     · Take a bath or shower before you come in for your surgery. Do not apply lotions, perfumes, deodorants, or nail polish.     · Do not shave the surgical site yourself.     · Take off all jewelry and piercings. And take out contact lenses, if you wear them. At the hospital or surgery center   · Bring a picture ID.     · The area for surgery is often marked to make sure there are no errors.     · You will be kept comfortable and safe by your anesthesia provider. You will be asleep during the surgery.     · The surgery usually takes 1 to 2 hours. When should you call your doctor? · You have questions or concerns.     · You don't understand how to prepare for your surgery.     · You become ill before the surgery (such as fever, flu, or a cold).     · You need to reschedule or have changed your mind about having the surgery. Where can you learn more?   Go to https://chpepiceweb.[email or web address]. org and sign in to your [employer] account. Enter I996 in the [facility] box to learn more about \"Gallbladder Removal: Before Your Surgery. \"     If you do not have an account, please click on the \"Sign Up Now\" link. Current as of: April 15, 2020               Content Version: 12.6  © 2006-2020 [employer]. Care instructions adapted under license by [facility] ([facility]). If you have questions about a medical condition or this instruction, always ask your healthcare professional. [address] any warranty or liability for your use of this information. Patient Education        Low-Fat Diet for Gallbladder Disease: Care Instructions  Your Care Instructions     When you eat, the gallbladder releases bile, which helps you digest the fat in food. If you have an inflamed gallbladder, this may cause pain. A low-fat diet may give your gallbladder a rest so you can start to heal. Your doctor and dietitian can help you make an eating plan that does not irritate your digestive system. Always talk with your doctor or dietitian before you make changes in your diet. Follow-up care is a key part of your treatment and safety. Be sure to make and go to all appointments, and call your doctor if you are having problems. It's also a good idea to know your test results and keep a list of the medicines you take. How can you care for yourself at home? · Eat many small meals and snacks each day instead of three large meals. · Choose lean meats. ? Eat no more than 5 to 6½ ounces of meat a day. ? Cut off all fat you can see. ? Eat chicken and turkey without the skin. ? Many types of fish, such as salmon, lake trout, tuna, and herring, provide healthy omega-3 fat. But, avoid fish canned in oil, such as sardines in olive oil. ? Bake, broil, or grill meats, poultry, or fish instead of frying them in butter or fat.   · Drink or eat nonfat or low-fat milk, yogurt, cheese, or other milk products each day. ? Read the labels on cheeses, and choose those with less than 5 grams of fat an ounce. ? Try fat-free sour cream, cream cheese, or yogurt. ? Avoid cream soups and cream sauces on pasta. ? Eat low-fat ice cream, frozen yogurt, or sorbet. Avoid regular ice cream.  · Eat whole-grain cereals, breads, crackers, rice, or pasta. Avoid high-fat foods such as croissants, scones, biscuits, waffles, doughnuts, muffins, granola, and high-fat breads. · Flavor your foods with herbs and spices (such as basil, tarragon, or mint), fat-free sauces, or lemon juice instead of butter. You can also use butter substitutes, fat-free mayonnaise, or fat-free dressing. · Try applesauce, prune puree, or mashed bananas to replace some or all of the fat when you bake. · Limit fats and oils, such as butter, margarine, mayonnaise, and salad dressing, to no more than 1 tablespoon a meal.  · Avoid high-fat foods, such as:  ? Chocolate, whole milk, ice cream, and processed cheese. ? Fried or buttered foods. ? Sausage, salami, and veras. ? Cinnamon rolls, cakes, pies, cookies, and other pastries. ? Prepared snack foods, such as potato chips, nut and granola bars, and mixed nuts. ? Coconut and avocado. · Learn how to read food labels for serving sizes and ingredients. Fast-food and convenience-food meals often have lots of fat. Where can you learn more? Go to https://OneView CommercepeChalet Techeb.healthCarina Technology. org and sign in to your [employer] account. Enter V288 in the [facility] box to learn more about \"Low-Fat Diet for Gallbladder Disease: Care Instructions. \"     If you do not have an account, please click on the \"Sign Up Now\" link. Current as of: August 22, 2019               Content Version: 12.6  © 0598-6809 [employer]. Care instructions adapted under license by [facility]).  If you have questions about a medical condition or this instruction, always ask your healthcare professional. [address] any warranty or liability for your use of this information.

## 2020-10-13 NOTE — PROGRESS NOTES
Subjective:      Patient ID: Hubert Joseph. is a 50 y.o. male. Chief Complaint   Patient presents with    Surgical Consult     new patient- ref Dr. Poonam Ruth- gallbladder-  10/6       HPI    Review of Systems   Constitutional: Negative for activity change, appetite change, chills, diaphoresis, fatigue, fever and unexpected weight change. HENT: Negative for congestion, dental problem, drooling, ear discharge, ear pain, facial swelling, hearing loss, mouth sores, nosebleeds, postnasal drip, rhinorrhea, sinus pressure, sneezing, sore throat, tinnitus, trouble swallowing and voice change. Eyes: Negative for photophobia, pain, discharge, redness, itching and visual disturbance. Respiratory: Negative for apnea, cough, choking, chest tightness, shortness of breath, wheezing and stridor. Cardiovascular: Negative for chest pain, palpitations and leg swelling. Gastrointestinal: Positive for abdominal pain (right side abd pain-shoulder pain), constipation, diarrhea, nausea and vomiting. Negative for abdominal distention, anal bleeding, blood in stool and rectal pain. Endocrine: Negative for cold intolerance, heat intolerance, polydipsia, polyphagia and polyuria. Genitourinary: Negative for decreased urine volume, difficulty urinating, dysuria, enuresis, flank pain, frequency, genital sores, hematuria and urgency. Musculoskeletal: Negative for arthralgias, back pain, gait problem, joint swelling, myalgias, neck pain and neck stiffness. Skin: Negative for color change, pallor, rash and wound. Allergic/Immunologic: Negative for environmental allergies, food allergies and immunocompromised state. Neurological: Negative for dizziness, tremors, seizures, syncope, facial asymmetry, speech difficulty, weakness, light-headedness, numbness and headaches. Hematological: Negative for adenopathy. Does not bruise/bleed easily.    Psychiatric/Behavioral: Negative for agitation, behavioral problems, confusion, decreased concentration, dysphoric mood, hallucinations, self-injury, sleep disturbance and suicidal ideas. The patient is not nervous/anxious and is not hyperactive.       /62 (Site: Right Upper Arm, Position: Sitting, Cuff Size: Medium Adult)   Pulse 81   Temp 96.7 °F (35.9 °C) (Temporal)   Resp 18   Ht 6' (1.829 m)   Wt (!) 418 lb 3.2 oz (189.7 kg)   SpO2 91%   BMI 56.72 kg/m²     Objective:   Physical Exam    Assessment:            Plan:              Newton Kahn LPN

## 2020-10-19 ENCOUNTER — HOSPITAL ENCOUNTER (OUTPATIENT)
Age: 55
Setting detail: SPECIMEN
Discharge: HOME | End: 2020-10-19
Payer: COMMERCIAL

## 2020-10-19 PROCEDURE — U0003 INFECTIOUS AGENT DETECTION BY NUCLEIC ACID (DNA OR RNA); SEVERE ACUTE RESPIRATORY SYNDROME CORONAVIRUS 2 (SARS-COV-2) (CORONAVIRUS DISEASE [COVID-19]), AMPLIFIED PROBE TECHNIQUE, MAKING USE OF HIGH THROUGHPUT TECHNOLOGIES AS DESCRIBED BY CMS-2020-01-R: HCPCS

## 2020-10-20 ENCOUNTER — TELEPHONE (OUTPATIENT)
Dept: FAMILY MEDICINE CLINIC | Age: 55
End: 2020-10-20

## 2020-10-20 NOTE — TELEPHONE ENCOUNTER
In order for us to get the MRI approved, he will have to do PT first.  Other option would be to go directly to Ortho who may be able to get it approved. Thoughts?

## 2020-10-20 NOTE — TELEPHONE ENCOUNTER
Pt called office left vm asking to get an MRI ordered to check for a tear in his right knee. Pt says this was discussed with you previously. Please advise.

## 2020-10-21 LAB — SARS-COV-2: NOT DETECTED

## 2020-10-23 NOTE — PROGRESS NOTES
Patient contacted regarding COVID-19 screen. Following questions asked: In the last month, have you been in contact with someone who was confirmed or suspected to have Coronavirus/COVID-19:  Patient stated NO  Tested positive in the last 14 days for COVID? Pt Stated no    Pt was informed can be a visitor allowed. Please bring masks. Do you or family members have any of the following symptoms:  Cough-no   Muscle pain-no   Shortness of breath-no   Fever-no   Weakness-no  Severe headache-no   Sore throat-no   Respiratory symptoms-no    Have you traveled internationally in the last month?  No     Have you been to the emergency room recently-no

## 2020-10-26 ENCOUNTER — HOSPITAL ENCOUNTER (OUTPATIENT)
Age: 55
Setting detail: OUTPATIENT SURGERY
Discharge: HOME | End: 2020-10-26
Attending: SURGERY | Admitting: SURGERY
Payer: COMMERCIAL

## 2020-10-26 ENCOUNTER — ANESTHESIA (OUTPATIENT)
Dept: OPERATING ROOM | Age: 55
End: 2020-10-26
Payer: COMMERCIAL

## 2020-10-26 ENCOUNTER — ANESTHESIA EVENT (OUTPATIENT)
Dept: OPERATING ROOM | Age: 55
End: 2020-10-26
Payer: COMMERCIAL

## 2020-10-26 VITALS
SYSTOLIC BLOOD PRESSURE: 135 MMHG | HEIGHT: 73 IN | RESPIRATION RATE: 18 BRPM | BODY MASS INDEX: 41.75 KG/M2 | DIASTOLIC BLOOD PRESSURE: 70 MMHG | HEART RATE: 67 BPM | WEIGHT: 315 LBS | OXYGEN SATURATION: 90 % | TEMPERATURE: 98.2 F

## 2020-10-26 VITALS — DIASTOLIC BLOOD PRESSURE: 96 MMHG | OXYGEN SATURATION: 95 % | SYSTOLIC BLOOD PRESSURE: 179 MMHG

## 2020-10-26 PROCEDURE — 7100000011 HC PHASE II RECOVERY - ADDTL 15 MIN: Performed by: SURGERY

## 2020-10-26 PROCEDURE — 6360000002 HC RX W HCPCS

## 2020-10-26 PROCEDURE — 6370000000 HC RX 637 (ALT 250 FOR IP): Performed by: SURGERY

## 2020-10-26 PROCEDURE — 2580000003 HC RX 258: Performed by: SURGERY

## 2020-10-26 PROCEDURE — C1894 INTRO/SHEATH, NON-LASER: HCPCS | Performed by: SURGERY

## 2020-10-26 PROCEDURE — 2500000003 HC RX 250 WO HCPCS: Performed by: SURGERY

## 2020-10-26 PROCEDURE — 2500000003 HC RX 250 WO HCPCS: Performed by: REGISTERED NURSE

## 2020-10-26 PROCEDURE — 3600000002 HC SURGERY LEVEL 2 BASE: Performed by: SURGERY

## 2020-10-26 PROCEDURE — 7100000010 HC PHASE II RECOVERY - FIRST 15 MIN: Performed by: SURGERY

## 2020-10-26 PROCEDURE — 6360000002 HC RX W HCPCS: Performed by: ANESTHESIOLOGY

## 2020-10-26 PROCEDURE — 3700000001 HC ADD 15 MINUTES (ANESTHESIA): Performed by: SURGERY

## 2020-10-26 PROCEDURE — 2709999900 HC NON-CHARGEABLE SUPPLY: Performed by: SURGERY

## 2020-10-26 PROCEDURE — 47562 LAPAROSCOPIC CHOLECYSTECTOMY: CPT | Performed by: SURGERY

## 2020-10-26 PROCEDURE — 3600000012 HC SURGERY LEVEL 2 ADDTL 15MIN: Performed by: SURGERY

## 2020-10-26 PROCEDURE — 7100000001 HC PACU RECOVERY - ADDTL 15 MIN: Performed by: SURGERY

## 2020-10-26 PROCEDURE — 6360000002 HC RX W HCPCS: Performed by: REGISTERED NURSE

## 2020-10-26 PROCEDURE — 2500000003 HC RX 250 WO HCPCS: Performed by: ANESTHESIOLOGY

## 2020-10-26 PROCEDURE — 6360000002 HC RX W HCPCS: Performed by: SURGERY

## 2020-10-26 PROCEDURE — 3700000000 HC ANESTHESIA ATTENDED CARE: Performed by: SURGERY

## 2020-10-26 PROCEDURE — 7100000000 HC PACU RECOVERY - FIRST 15 MIN: Performed by: SURGERY

## 2020-10-26 PROCEDURE — 88304 TISSUE EXAM BY PATHOLOGIST: CPT

## 2020-10-26 RX ORDER — HYDROCODONE BITARTRATE AND ACETAMINOPHEN 5; 325 MG/1; MG/1
1 TABLET ORAL EVERY 4 HOURS PRN
Status: DISCONTINUED | OUTPATIENT
Start: 2020-10-26 | End: 2020-10-26 | Stop reason: HOSPADM

## 2020-10-26 RX ORDER — HYDRALAZINE HYDROCHLORIDE 20 MG/ML
10 INJECTION INTRAMUSCULAR; INTRAVENOUS EVERY 10 MIN PRN
Status: DISCONTINUED | OUTPATIENT
Start: 2020-10-26 | End: 2020-10-26 | Stop reason: HOSPADM

## 2020-10-26 RX ORDER — PROMETHAZINE HYDROCHLORIDE 25 MG/ML
25 INJECTION, SOLUTION INTRAMUSCULAR; INTRAVENOUS ONCE
Status: COMPLETED | OUTPATIENT
Start: 2020-10-26 | End: 2020-10-26

## 2020-10-26 RX ORDER — LANOLIN ALCOHOL/MO/W.PET/CERES
3 CREAM (GRAM) TOPICAL DAILY
COMMUNITY

## 2020-10-26 RX ORDER — ONDANSETRON 2 MG/ML
4 INJECTION INTRAMUSCULAR; INTRAVENOUS
Status: DISCONTINUED | OUTPATIENT
Start: 2020-10-26 | End: 2020-10-26 | Stop reason: HOSPADM

## 2020-10-26 RX ORDER — DEXAMETHASONE SODIUM PHOSPHATE 4 MG/ML
INJECTION, SOLUTION INTRA-ARTICULAR; INTRALESIONAL; INTRAMUSCULAR; INTRAVENOUS; SOFT TISSUE PRN
Status: DISCONTINUED | OUTPATIENT
Start: 2020-10-26 | End: 2020-10-26 | Stop reason: SDUPTHER

## 2020-10-26 RX ORDER — INDOCYANINE GREEN AND WATER 25 MG
5 KIT INJECTION ONCE
Status: COMPLETED | OUTPATIENT
Start: 2020-10-26 | End: 2020-10-26

## 2020-10-26 RX ORDER — LABETALOL 20 MG/4 ML (5 MG/ML) INTRAVENOUS SYRINGE
5 4 TIMES DAILY PRN
Status: DISCONTINUED | OUTPATIENT
Start: 2020-10-26 | End: 2020-10-26 | Stop reason: HOSPADM

## 2020-10-26 RX ORDER — PROMETHAZINE HYDROCHLORIDE 12.5 MG/1
12.5 TABLET ORAL EVERY 6 HOURS PRN
Qty: 20 TABLET | Refills: 0 | Status: SHIPPED | OUTPATIENT
Start: 2020-10-26 | End: 2020-11-02

## 2020-10-26 RX ORDER — KETOROLAC TROMETHAMINE 30 MG/ML
30 INJECTION, SOLUTION INTRAMUSCULAR; INTRAVENOUS ONCE
Status: COMPLETED | OUTPATIENT
Start: 2020-10-26 | End: 2020-10-26

## 2020-10-26 RX ORDER — HYDROCODONE BITARTRATE AND ACETAMINOPHEN 5; 325 MG/1; MG/1
1 TABLET ORAL EVERY 4 HOURS PRN
Qty: 30 TABLET | Refills: 0 | Status: SHIPPED | OUTPATIENT
Start: 2020-10-26 | End: 2020-10-31

## 2020-10-26 RX ORDER — HYDROCODONE BITARTRATE AND ACETAMINOPHEN 5; 325 MG/1; MG/1
1 TABLET ORAL EVERY 4 HOURS PRN
Status: DISCONTINUED | OUTPATIENT
Start: 2020-10-26 | End: 2020-10-26 | Stop reason: CLARIF

## 2020-10-26 RX ORDER — BUPIVACAINE HYDROCHLORIDE 5 MG/ML
INJECTION, SOLUTION EPIDURAL; INTRACAUDAL PRN
Status: DISCONTINUED | OUTPATIENT
Start: 2020-10-26 | End: 2020-10-26 | Stop reason: ALTCHOICE

## 2020-10-26 RX ORDER — SODIUM CHLORIDE 0.9 % (FLUSH) 0.9 %
10 SYRINGE (ML) INJECTION EVERY 12 HOURS SCHEDULED
Status: DISCONTINUED | OUTPATIENT
Start: 2020-10-26 | End: 2020-10-26 | Stop reason: HOSPADM

## 2020-10-26 RX ORDER — KETOROLAC TROMETHAMINE 30 MG/ML
INJECTION, SOLUTION INTRAMUSCULAR; INTRAVENOUS
Status: COMPLETED
Start: 2020-10-26 | End: 2020-10-26

## 2020-10-26 RX ORDER — FENTANYL CITRATE 50 UG/ML
50 INJECTION, SOLUTION INTRAMUSCULAR; INTRAVENOUS EVERY 5 MIN PRN
Status: DISCONTINUED | OUTPATIENT
Start: 2020-10-26 | End: 2020-10-26 | Stop reason: HOSPADM

## 2020-10-26 RX ORDER — MIDAZOLAM HYDROCHLORIDE 1 MG/ML
INJECTION INTRAMUSCULAR; INTRAVENOUS PRN
Status: DISCONTINUED | OUTPATIENT
Start: 2020-10-26 | End: 2020-10-26 | Stop reason: SDUPTHER

## 2020-10-26 RX ORDER — MEPERIDINE HYDROCHLORIDE 25 MG/ML
12.5 INJECTION INTRAMUSCULAR; INTRAVENOUS; SUBCUTANEOUS EVERY 5 MIN PRN
Status: DISCONTINUED | OUTPATIENT
Start: 2020-10-26 | End: 2020-10-26 | Stop reason: HOSPADM

## 2020-10-26 RX ORDER — HYDRALAZINE HYDROCHLORIDE 20 MG/ML
5 INJECTION INTRAMUSCULAR; INTRAVENOUS EVERY 10 MIN PRN
Status: DISCONTINUED | OUTPATIENT
Start: 2020-10-26 | End: 2020-10-26

## 2020-10-26 RX ORDER — SODIUM CHLORIDE 0.9 % (FLUSH) 0.9 %
10 SYRINGE (ML) INJECTION PRN
Status: DISCONTINUED | OUTPATIENT
Start: 2020-10-26 | End: 2020-10-26 | Stop reason: HOSPADM

## 2020-10-26 RX ORDER — SUCCINYLCHOLINE/SOD CL,ISO/PF 200MG/10ML
SYRINGE (ML) INTRAVENOUS PRN
Status: DISCONTINUED | OUTPATIENT
Start: 2020-10-26 | End: 2020-10-26 | Stop reason: SDUPTHER

## 2020-10-26 RX ORDER — MORPHINE SULFATE 2 MG/ML
2 INJECTION, SOLUTION INTRAMUSCULAR; INTRAVENOUS EVERY 5 MIN PRN
Status: DISCONTINUED | OUTPATIENT
Start: 2020-10-26 | End: 2020-10-26 | Stop reason: HOSPADM

## 2020-10-26 RX ORDER — ONDANSETRON 2 MG/ML
INJECTION INTRAMUSCULAR; INTRAVENOUS PRN
Status: DISCONTINUED | OUTPATIENT
Start: 2020-10-26 | End: 2020-10-26 | Stop reason: SDUPTHER

## 2020-10-26 RX ORDER — DIPHENHYDRAMINE HYDROCHLORIDE 50 MG/ML
12.5 INJECTION INTRAMUSCULAR; INTRAVENOUS
Status: DISCONTINUED | OUTPATIENT
Start: 2020-10-26 | End: 2020-10-26 | Stop reason: HOSPADM

## 2020-10-26 RX ORDER — PROMETHAZINE HYDROCHLORIDE 25 MG/ML
INJECTION, SOLUTION INTRAMUSCULAR; INTRAVENOUS
Status: COMPLETED
Start: 2020-10-26 | End: 2020-10-26

## 2020-10-26 RX ORDER — ROCURONIUM BROMIDE 10 MG/ML
INJECTION, SOLUTION INTRAVENOUS PRN
Status: DISCONTINUED | OUTPATIENT
Start: 2020-10-26 | End: 2020-10-26 | Stop reason: SDUPTHER

## 2020-10-26 RX ORDER — PROPOFOL 10 MG/ML
INJECTION, EMULSION INTRAVENOUS PRN
Status: DISCONTINUED | OUTPATIENT
Start: 2020-10-26 | End: 2020-10-26 | Stop reason: SDUPTHER

## 2020-10-26 RX ORDER — FENTANYL CITRATE 50 UG/ML
INJECTION, SOLUTION INTRAMUSCULAR; INTRAVENOUS PRN
Status: DISCONTINUED | OUTPATIENT
Start: 2020-10-26 | End: 2020-10-26 | Stop reason: SDUPTHER

## 2020-10-26 RX ORDER — SODIUM CHLORIDE 9 MG/ML
INJECTION, SOLUTION INTRAVENOUS CONTINUOUS
Status: DISCONTINUED | OUTPATIENT
Start: 2020-10-26 | End: 2020-10-26 | Stop reason: HOSPADM

## 2020-10-26 RX ADMIN — LABETALOL 20 MG/4 ML (5 MG/ML) INTRAVENOUS SYRINGE 5 MG: at 10:20

## 2020-10-26 RX ADMIN — SUGAMMADEX 380 MG: 100 INJECTION, SOLUTION INTRAVENOUS at 09:09

## 2020-10-26 RX ADMIN — FENTANYL CITRATE 50 MCG: 50 INJECTION, SOLUTION INTRAMUSCULAR; INTRAVENOUS at 09:50

## 2020-10-26 RX ADMIN — HYDRALAZINE HYDROCHLORIDE 10 MG: 20 INJECTION, SOLUTION INTRAMUSCULAR; INTRAVENOUS at 10:35

## 2020-10-26 RX ADMIN — PROPOFOL 200 MG: 10 INJECTION, EMULSION INTRAVENOUS at 08:33

## 2020-10-26 RX ADMIN — LABETALOL 20 MG/4 ML (5 MG/ML) INTRAVENOUS SYRINGE 5 MG: at 10:30

## 2020-10-26 RX ADMIN — PROMETHAZINE HYDROCHLORIDE 25 MG: 25 INJECTION INTRAMUSCULAR; INTRAVENOUS at 09:35

## 2020-10-26 RX ADMIN — MIDAZOLAM HYDROCHLORIDE 2 MG: 1 INJECTION, SOLUTION INTRAMUSCULAR; INTRAVENOUS at 08:33

## 2020-10-26 RX ADMIN — SODIUM CHLORIDE: 9 INJECTION, SOLUTION INTRAVENOUS at 08:28

## 2020-10-26 RX ADMIN — ONDANSETRON HYDROCHLORIDE 4 MG: 4 INJECTION, SOLUTION INTRAMUSCULAR; INTRAVENOUS at 08:49

## 2020-10-26 RX ADMIN — Medication 160 MG: at 08:33

## 2020-10-26 RX ADMIN — SODIUM CHLORIDE: 9 INJECTION, SOLUTION INTRAVENOUS at 11:05

## 2020-10-26 RX ADMIN — INDOCYANINE GREEN AND WATER 5 MG: KIT at 07:22

## 2020-10-26 RX ADMIN — ROCURONIUM BROMIDE 30 MG: 10 INJECTION INTRAVENOUS at 08:49

## 2020-10-26 RX ADMIN — HYDRALAZINE HYDROCHLORIDE 10 MG: 20 INJECTION, SOLUTION INTRAMUSCULAR; INTRAVENOUS at 10:45

## 2020-10-26 RX ADMIN — SODIUM CHLORIDE: 9 INJECTION, SOLUTION INTRAVENOUS at 07:20

## 2020-10-26 RX ADMIN — HYDROCODONE BITARTRATE AND ACETAMINOPHEN 1 TABLET: 5; 325 TABLET ORAL at 12:11

## 2020-10-26 RX ADMIN — FENTANYL CITRATE 100 MCG: 50 INJECTION, SOLUTION INTRAMUSCULAR; INTRAVENOUS at 08:33

## 2020-10-26 RX ADMIN — PROMETHAZINE HYDROCHLORIDE 25 MG: 25 INJECTION, SOLUTION INTRAMUSCULAR; INTRAVENOUS at 09:35

## 2020-10-26 RX ADMIN — KETOROLAC TROMETHAMINE 30 MG: 30 INJECTION, SOLUTION INTRAMUSCULAR; INTRAVENOUS at 10:05

## 2020-10-26 RX ADMIN — DEXAMETHASONE SODIUM PHOSPHATE 10 MG: 4 INJECTION, SOLUTION INTRAMUSCULAR; INTRAVENOUS at 08:49

## 2020-10-26 RX ADMIN — LABETALOL 20 MG/4 ML (5 MG/ML) INTRAVENOUS SYRINGE 5 MG: at 08:53

## 2020-10-26 RX ADMIN — ROCURONIUM BROMIDE 20 MG: 10 INJECTION INTRAVENOUS at 08:41

## 2020-10-26 RX ADMIN — CEFOXITIN 2 G: 2 INJECTION, POWDER, FOR SOLUTION INTRAVENOUS at 08:43

## 2020-10-26 RX ADMIN — FENTANYL CITRATE 50 MCG: 50 INJECTION, SOLUTION INTRAMUSCULAR; INTRAVENOUS at 09:35

## 2020-10-26 RX ADMIN — HYDROMORPHONE HYDROCHLORIDE 1 MG: 1 INJECTION, SOLUTION INTRAMUSCULAR; INTRAVENOUS; SUBCUTANEOUS at 09:35

## 2020-10-26 RX ADMIN — HYDROMORPHONE HYDROCHLORIDE 1 MG: 1 INJECTION, SOLUTION INTRAMUSCULAR; INTRAVENOUS; SUBCUTANEOUS at 09:55

## 2020-10-26 RX ADMIN — PROPOFOL 100 MG: 10 INJECTION, EMULSION INTRAVENOUS at 08:49

## 2020-10-26 ASSESSMENT — PULMONARY FUNCTION TESTS
PIF_VALUE: 1
PIF_VALUE: 21
PIF_VALUE: 1
PIF_VALUE: 26
PIF_VALUE: 22
PIF_VALUE: 22
PIF_VALUE: 26
PIF_VALUE: 1
PIF_VALUE: 1
PIF_VALUE: 25
PIF_VALUE: 25
PIF_VALUE: 17
PIF_VALUE: 18
PIF_VALUE: 21
PIF_VALUE: 25
PIF_VALUE: 17
PIF_VALUE: 26
PIF_VALUE: 17
PIF_VALUE: 18
PIF_VALUE: 3
PIF_VALUE: 2
PIF_VALUE: 0
PIF_VALUE: 24
PIF_VALUE: 25
PIF_VALUE: 24
PIF_VALUE: 1
PIF_VALUE: 26
PIF_VALUE: 2
PIF_VALUE: 26
PIF_VALUE: 1
PIF_VALUE: 29
PIF_VALUE: 18
PIF_VALUE: 26
PIF_VALUE: 26
PIF_VALUE: 1
PIF_VALUE: 18
PIF_VALUE: 24
PIF_VALUE: 25
PIF_VALUE: 24
PIF_VALUE: 23
PIF_VALUE: 26
PIF_VALUE: 23
PIF_VALUE: 24
PIF_VALUE: 23
PIF_VALUE: 2
PIF_VALUE: 26
PIF_VALUE: 18
PIF_VALUE: 0
PIF_VALUE: 1
PIF_VALUE: 21
PIF_VALUE: 26
PIF_VALUE: 2

## 2020-10-26 ASSESSMENT — PAIN DESCRIPTION - PAIN TYPE: TYPE: SURGICAL PAIN

## 2020-10-26 ASSESSMENT — PAIN SCALES - GENERAL
PAINLEVEL_OUTOF10: 8
PAINLEVEL_OUTOF10: 7
PAINLEVEL_OUTOF10: 7
PAINLEVEL_OUTOF10: 5
PAINLEVEL_OUTOF10: 6
PAINLEVEL_OUTOF10: 0
PAINLEVEL_OUTOF10: 8
PAINLEVEL_OUTOF10: 8
PAINLEVEL_OUTOF10: 9

## 2020-10-26 ASSESSMENT — PAIN - FUNCTIONAL ASSESSMENT: PAIN_FUNCTIONAL_ASSESSMENT: 0-10

## 2020-10-26 ASSESSMENT — PAIN DESCRIPTION - LOCATION: LOCATION: ABDOMEN

## 2020-10-26 NOTE — ANESTHESIA PRE PROCEDURE
Department of Anesthesiology  Preprocedure Note       Name:  Marie Richmond. Age:  50 y.o.  :  1965                                          MRN:  [id number]         Date:  10/26/2020      Surgeon: Talon Márquez):  Leif Mccartney DO    Procedure: Procedure(s):  LAP CHOLECYSTECTOMY    Medications prior to admission:   Prior to Admission medications    Medication Sig Start Date End Date Taking? Authorizing Provider   melatonin 3 MG TABS tablet Take 3 mg by mouth daily   Yes Historical Provider, MD   naproxen (NAPROSYN) 500 MG tablet Take 1 tablet by mouth 2 times daily (with meals) 20   Gisela Tracey DO       Current medications:    Current Facility-Administered Medications   Medication Dose Route Frequency Provider Last Rate Last Dose    0.9 % sodium chloride infusion   Intravenous Continuous Sharath Spence  mL/hr at 10/26/20 0720      sodium chloride flush 0.9 % injection 10 mL  10 mL Intravenous 2 times per day Sharath Spence DO        sodium chloride flush 0.9 % injection 10 mL  10 mL Intravenous PRN Sharath Spence DO        cefOXitin (MEFOXIN) 2 g in dextrose 5% 50 mL (mini-bag)  2 g Intravenous On Call to  Red Echols DO        morphine (PF) injection 2 mg  2 mg Intravenous Q5 Min PRN Althea Bullock MD        fentaNYL (SUBLIMAZE) injection 50 mcg  50 mcg Intravenous Q5 Min PRN Althea Bullock MD        HYDROmorphone (DILAUDID) injection 0.25 mg  0.25 mg Intravenous Q5 Min PRN Althea Bullock MD        diphenhydrAMINE (BENADRYL) injection 12.5 mg  12.5 mg Intravenous Once PRN Althea Bullock MD        [facility]etron [facility]) injection 4 mg  4 mg Intravenous Once PRN Althea Bullock MD        labetalol (NORMODYNE;TRANDATE) injection syringe 5 mg  5 mg Intravenous 4x Daily PRN Althea Bullock MD        hydrALAZINE (APRESOLINE) injection 5 mg  5 mg Intravenous Q10 Min PRN Althea Bullock MD        meperidine (DEMEROL) injection 12.5 mg  12.5 mg Intravenous Q5 Min PRN Althea Bullock MD           Allergies:     Allergies Allergen Reactions    No Known Allergies        Problem List:    Patient Active Problem List   Diagnosis Code    CAMI on CPAP G47.33, Z99.89    Morbid obesity (HCC) E66.01    IFG (impaired fasting glucose) R73.01    Hyperlipidemia with target LDL less than 100 E78.5    Renal cyst N28.1    Primary insomnia F51.01       Past Medical History:        Diagnosis Date    History of meniscal tear     right knee-takes Naproxen    Obesity     Seasonal allergies        Past Surgical History:        Procedure Laterality Date    OTHER SURGICAL HISTORY  2010    left hematoma lower-I&D Dr Ness Boy EXTRACTION  90's       Social History:    Social History     Tobacco Use    Smoking status: Never Smoker    Smokeless tobacco: Never Used   Substance Use Topics    Alcohol use: No                                Counseling given: Not Answered      Vital Signs (Current):   Vitals:    10/26/20 0703   BP: (!) 198/90   Pulse: 72   Resp: 18   Temp: 98 °F (36.7 °C)   TempSrc: Temporal   SpO2: 91%   Weight: (!) 418 lb 9.6 oz (189.9 kg)   Height: 6' 1\" (1.854 m)                                              BP Readings from Last 3 Encounters:   10/26/20 (!) 198/90   10/13/20 130/62   09/11/20 116/84       NPO Status: Time of last liquid consumption: 1830                        Time of last solid consumption: 1730                        Date of last liquid consumption: 10/25/20                        Date of last solid food consumption: 10/25/20    BMI:   Wt Readings from Last 3 Encounters:   10/26/20 (!) 418 lb 9.6 oz (189.9 kg)   10/13/20 (!) 418 lb 3.2 oz (189.7 kg)   09/11/20 (!) 412 lb (186.9 kg)     Body mass index is 55.23 kg/m².     CBC:   Lab Results   Component Value Date    WBC 6.8 09/10/2020    RBC 5.27 09/10/2020    HGB 15.1 09/10/2020    HCT 46.7 09/10/2020    MCV 89 09/10/2020    RDW 13.5 09/10/2020     09/10/2020       CMP:   Lab Results   Component Value Date     09/10/2020    K 3.9 09/10/2020     09/10/2020    CO2 23 09/10/2020    BUN 22 09/10/2020    CREATININE 1.02 09/10/2020    GLUCOSE 140 09/10/2020    PROT 7.2 09/10/2020    CALCIUM 9.3 09/10/2020    BILITOT 0.7 09/10/2020    ALKPHOS 85 09/10/2020    AST 59 09/10/2020    ALT 60 09/10/2020       POC Tests: No results for input(s): POCGLU, POCNA, POCK, POCCL, POCBUN, POCHEMO, POCHCT in the last 72 hours. Coags: No results found for: PROTIME, INR, APTT    HCG (If Applicable): No results found for: PREGTESTUR, PREGSERUM, HCG, HCGQUANT     ABGs: No results found for: PHART, PO2ART, FZZ8EGC, PEK0KRM, BEART, A4BDVQNE     Type & Screen (If Applicable):  No results found for: LABABO, LABRH    Drug/Infectious Status (If Applicable):  No results found for: HIV, HEPCAB    COVID-19 Screening (If Applicable):   Lab Results   Component Value Date    COVID19 Not Detected 10/19/2020         Anesthesia Evaluation  Patient summary reviewed and Nursing notes reviewed  Airway: Mallampati: IV        Dental: normal exam         Pulmonary:normal exam    (+) sleep apnea:                             Cardiovascular:                      Neuro/Psych:               GI/Hepatic/Renal:   (+) morbid obesity          Endo/Other:                     Abdominal:   (+) obese,         Vascular:                                        Anesthesia Plan      general     ASA 3       Induction: intravenous. Anesthetic plan and risks discussed with patient. Use of blood products discussed with patient whom. Plan discussed with CRNA.                   Naomi Olivera DO   10/26/2020

## 2020-10-26 NOTE — H&P
Darleen Santamaria. [facility], [address] [place] [address]. [address]  [place] [address]  [phone]  New Patient Evaluation in Office    Pt Name: Nettie Thomas. Date of Birth 1965   Today's Date: 10/13/2020  Medical Record Number: [id number]  Referring Provider: Nereida Primrose, DO  Primary Care Provider: Dory Cowan DO  Chief Complaint   Patient presents with   [facility] Consult     new patient- ref Dr. Gavin Farr- gallbladder- US 10/6     ASSESSMENT       Diagnosis Orders   1. Chronic cholecystitis with calculus  LAPAROSCOPY CHOLECYSTECTOMY    Covid-19 Ambulatory   2. Pre-op testing  Covid-19 Ambulatory     Past Medical History:   Diagnosis Date    History of meniscal tear     right knee-takes Naproxen    Obesity     Seasonal allergies           PLANS      1. Schedule Ray for L/S cholecystectomy possible open  2. The risks, options and alternatives were discussed at length with the patient. The risks including bleeding, infection, reoperation, bile duct injury and possible conversion to an open procedure were covered as well as nonresolution of pain. The possibility of other unforeseen complications including bile leak were also mentioned. Patient was made aware of intraoperative complications such as other organ injury or injury to surrounding structures. We also discussed the conduct of the operation, probable outpatient stay postoperatively and the typical post operative recovery and restrictions. The patients questions were answered. After this discussion, the patient would like to proceed with cholecystectomy. 3. Status: outpatient  4. Planned anesthesia: general  5. He will undergo pre-operative clearance per anesthesia guidelines with risk factors listed under the past medical history diagnosis & problem list.     Kerrie Timmons is a 50 y.o. male seen in the consultation for evaluation regarding gallbladder.  Onset of symptoms was gradual 3 years ago 09/10/2025    DTaP/Tdap/Td vaccine (8 - Td) 11/03/2025    Hepatitis A vaccine  Aged Out    Hepatitis B vaccine  Aged Out    Hib vaccine  Aged Out    Meningococcal (ACWY) vaccine  Aged Out    Pneumococcal 0-64 years Vaccine  Aged Out     Review of Systems  Constitutional: Negative for activity change, appetite change, chills, diaphoresis, fatigue, fever and unexpected weight change. HENT: Negative for congestion, dental problem, drooling, ear discharge, ear pain, facial swelling, hearing loss, mouth sores, nosebleeds, postnasal drip, rhinorrhea, sinus pressure, sneezing, sore throat, tinnitus, trouble swallowing and voice change. Eyes: Negative for photophobia, pain, discharge, redness, itching and visual disturbance. Respiratory: Negative for apnea, cough, choking, chest tightness, shortness of breath, wheezing and stridor. Cardiovascular: Negative for chest pain, palpitations and leg swelling. Gastrointestinal: Positive for abdominal pain (right side abd pain-shoulder pain), constipation, diarrhea, nausea and vomiting. Negative for abdominal distention, anal bleeding, blood in stool and rectal pain. Endocrine: Negative for cold intolerance, heat intolerance, polydipsia, polyphagia and polyuria. Genitourinary: Negative for decreased urine volume, difficulty urinating, dysuria, enuresis, flank pain, frequency, genital sores, hematuria and urgency. Musculoskeletal: Negative for arthralgias, back pain, gait problem, joint swelling, myalgias, neck pain and neck stiffness. Skin: Negative for color change, pallor, rash and wound. Allergic/Immunologic: Negative for environmental allergies, food allergies and immunocompromised state. Neurological: Negative for dizziness, tremors, seizures, syncope, facial asymmetry, speech difficulty, weakness, light-headedness, numbness and headaches. Hematological: Negative for adenopathy. Does not bruise/bleed easily.    Psychiatric/Behavioral: Negative for agitation, behavioral problems, confusion, decreased concentration, dysphoric mood, hallucinations, self-injury, sleep disturbance and suicidal ideas. The patient is not nervous/anxious and is not hyperactive. OBJECTIVE      VITALS:  height is 6' (1.829 m) and weight is 418 lb 3.2 oz (189.7 kg) (abnormal). His temporal temperature is 96.7 °F (35.9 °C). His blood pressure is 130/62 and his pulse is 81. His respiration is 18 and oxygen saturation is 91%. Pain Score:   0 - No pain Body mass index is 56.72 kg/m². CONSTITUTIONAL: Alert and oriented times 3, no acute distress and cooperative to examination with proper mood and affect. SKIN: Skin color, texture, turgor normal. No rashes or lesions. LYMPH: no cervical nodes, no inguinal nodes  HEENT: Head is normocephalic, atraumatic. EOMI, PERRLA. NECK: Supple, symmetrical, trachea midline, no adenopathy, thyroid symmetric, not enlarged and no tenderness, skin normal.  CHEST/LUNGS: chest symmetric with normal A/P diameter, normal respiratory rate and rhythm, lungs clear to auscultation without wheezes, rales or rhonchi. No accessory muscle use. Scars None   CARDIOVASCULAR: Heart sounds are normal.  Regular rate and rhythm without murmur, gallop or rub. Normal S1 and S2. Carotid and femoral pulses 2+/4 and equal bilaterally. ABDOMEN: morbidly obese No scar(s) present. Normal bowel sounds. No bruits. soft, nondistended, no masses or organomegaly. no evidence of hernia. Percussion: Normal without hepatosplenomegally. Gallbladder is nonpalpable and is tender. RECTAL: deferred, not clinically indicated  NEUROLOGIC: There are no focalizing motor or sensory deficits. CN II-XII are grossly intact. Shaaron Money EXTREMITIES: no cyanosis, no clubbing and no edema. Thank you for the interesting evaluation. Further recommendations as listed above.        Electronically signed by Leif Mccartney DO on 10/13/2020 at 12:18 PM      Leif Mccartney DO  [facility]  History and Physical Update    Pt Name: Anders Jane MRN: [id number]  YOB: 1965  Date of evaluation: 10/26/2020    I have examined the patient and reviewed the H&P/Consult and there are no changes to the patient or plans.          Electronically signed by Kirill Marie DO on 10/26/2020 at 7:59 AM

## 2020-10-26 NOTE — OP NOTE
[facility]  RECORD OF OPERATION  PATIENT NAME: Leatha Emanuel. MEDICAL RECORD NO. [id number]  SURGEON: Jaren Castorena  Primary Care Physician: Leif Do DO     PROCEDURE PERFORMED:  10/26/2020  PREOPERATIVE DIAGNOSIS: CHRONIC CHOLECYSTITIS WITH CALCULUS  POSTOPERATIVE DIAGNOSIS: Same, path pending, fatty infiltration of the liver  PROCEDURE PERFORMED:  Laparoscopic cholecystectomy. SURGEON:  Dr. Kamilah Spence. ANESTHESIA:  General with local.  ESTIMATED BLOOD LOSS:  20 ml  SPECIMEN:  Gallbladder sent to pathology for analysis. COMPLICATIONS:  None immediately appreciated. DISCUSSION: Janes Andrews is a 50y.o. year old male who was seen in evaluation at request of Leif Do DO regarding signs and symptoms, gallbladder disease, radiographic findings of gallstones. After history and physical examination was performed potential diagnostic and therapeutic modalities discussed with the patient. Operative and non operative management was discussed. Laparoscopic and open approaches reviewed. He was given opportunity to ask questions. Once answered informed consent was obtained. He was brought to operating room on 10/26/2020 for procedure. OPERATIVE FINDINGS:  At time of laparoscopy liver contour was evident of fatty infiltration. The gallbladder did have stones. Common bile duct was well visualized. Ductal structures well visualized. The remainder of abdominal viscera was unremarkable. Gallbladder removed as described below. PROCEDURE:  The patient was brought to the operating room and placed in supine position. Placed under continuous cardiac telemetry, blood pressure, pulse oximetry monitoring and placed under general anesthesia by anesthesia department. The anterior abdominal wall was prepped and draped in sterile fashion. 1 cm periumbilical incision made with #11 scalpel blade. 10 mm Optiview port inserted into the abdomen under direct visualization. Pneumoperitoneum was created to total of 17 mm of Mercury. Visual inspection of the abdomen carried out. Please see operative findings above for specifics. An additional 10 mm port was placed to right lateral falciform ligament. Two 5 mm ports placed to right lateral abdominal wall under direct visualization with no injury to underlying viscera. The gallbladder then grasped, retracted up towards right shoulder. Pericholecystic adhesions lysed using blunt dissection. Blunt dissection through the triangle of Calot allowed adequate visualization of cystic duct and cystic artery. The common bile duct was seen and free of harm. The cystic duct and cystic artery were clipped using Endo clips, and incised using Endo rupert. The gallbladder then retracted laterally and dissected free off the gallbladder fossa using combination of blunt dissection and electrocautery. The gallbladder was then placed in a specimen bag and removed out the superior 10 mm port site. The fossa was reinspected. Adequate hemostasis appreciated. Clips noted to be in proper position. Common bile duct free of harm. No evidence of bile leak was evident. The right upper quadrant was copiously irrigated. Excess irrigation and fluid removed via suction. All ports and instruments removed from the patient's abdomen. Pneumoperitoneum  was reduced. Fascial defects approximated using 0 Vicryl suture. Skin edges was infiltrated with local anesthetic. Skin closed using 4-0 Vicryl suture in combination of single interrupted running subcuticular fashion. The wound was then cleansed prepared with Mastisol, Steri-Strips, sterile dressings were applied. Patient brought out of anesthesia, transferred to PACU in stable and satisfactory condition. No immediate complications evident. All sponge, instrument and needle counts were correct at the completion of procedure. Postoperative findings were discussed with the patient's family.   He was given discharge instructions, prescriptions for analgesics and antiemetics. He will follow up in my office in a 2-week period of time for reevaluation.       Electronically signed by Jeffrey Momin DO on 10/26/2020 at 9:08 AM

## 2020-10-26 NOTE — ANESTHESIA POSTPROCEDURE EVALUATION
Department of Anesthesiology  Postprocedure Note    Patient: Cee Doshi MRN: [id number]  YOB: 1965  Date of evaluation: 10/26/2020  Time:  9:36 AM     Procedure Summary     Date:  10/26/20 Room / Location:  [place] [address] [facility]    Anesthesia Start:  6649 Anesthesia Stop:  2491    Procedure:  LAP CHOLECYSTECTOMY (N/A Abdomen) Diagnosis:  (CHRONIC CHOLECYSTITIS WITH CALCULUS)    Surgeon:  Melba Mota DO Responsible Provider:  Lacho Perez DO    Anesthesia Type:  general ASA Status:  3          Anesthesia Type: general    Shawn Phase I: Shawn Score: 10    Shawn Phase II:      Last vitals: Reviewed and per EMR flowsheets.        Anesthesia Post Evaluation    Patient location during evaluation: PACU  Patient participation: complete - patient participated  Level of consciousness: awake  Airway patency: patent  Nausea & Vomiting: no nausea and no vomiting  Complications: no  Cardiovascular status: hemodynamically stable  Respiratory status: acceptable  Hydration status: hypervolemic

## 2020-10-26 NOTE — FLOWSHEET NOTE
10/26/20 1332   Provider Notification   Reason for Communication Review case   Provider Name Bebe   Provider Notification Physician   Method of Communication Call   Response Other (Comment)   Notification Time 1330   Provider contacted r/t pt ramin fell through, he is calling an Wiliam Tovar and will be home alone.  is okay with that.

## 2020-10-26 NOTE — PROGRESS NOTES
Oriented to sds         Refuses flu and pneumonia vaccine. Family updated to stay in room. Informed family to take belonging with them if they leave. Keep nothing of value in room unattended. Medication given back to family. Family is taking them with them. Ipt was asked and agreed to first name and last initial being put on white boards. fall risks applied. SCD Applied to patient. Warming blanket applied to patient. Pt denies any abuse or thoughts of suicide. .    Pt is very nervous.  because 1st surgery

## 2020-10-26 NOTE — FLOWSHEET NOTE
Pt returned to [facility] room 12. Vitals and assessment as charted. 0.9 infusing, @900ml to count from PACU. Pt has pudding and water. No family present, pt stated he would need to call for a ride at discharge. Pt verbalized understanding of discharge criteria and call light use. Call light in reach.

## 2020-10-27 ENCOUNTER — TELEPHONE (OUTPATIENT)
Dept: SURGERY | Age: 55
End: 2020-10-27

## 2020-10-27 NOTE — TELEPHONE ENCOUNTER
S/p 10/26 vivi savage    Attempted to contact pt post op- no answer- left appropriate VM with return phone number.

## 2020-11-10 ENCOUNTER — OFFICE VISIT (OUTPATIENT)
Dept: SURGERY | Age: 55
End: 2020-11-10

## 2020-11-10 VITALS
RESPIRATION RATE: 18 BRPM | SYSTOLIC BLOOD PRESSURE: 120 MMHG | WEIGHT: 315 LBS | HEART RATE: 50 BPM | HEIGHT: 73 IN | DIASTOLIC BLOOD PRESSURE: 80 MMHG | OXYGEN SATURATION: 93 % | TEMPERATURE: 96.3 F | BODY MASS INDEX: 41.75 KG/M2

## 2020-11-10 PROCEDURE — 99024 POSTOP FOLLOW-UP VISIT: CPT | Performed by: SURGERY

## 2020-11-10 NOTE — PROGRESS NOTES
Alex Paiz. [facility], [address]. [address]  [place] [address]  [phone]  Post Procedure Evaluation in Office    Pt Name: Shahzad Mercer. Date of Birth 1965   Today's Date: 11/10/2020  Medical Record Number: [id number]  Referring Provider: No ref. provider found  Primary Care Provider: Allen Gilliam DO  Chief Complaint   Patient presents with    Post-Op Check     s/p- Laparoscopic cholecystectomy 10/26     ASSESSMENT       Diagnosis Orders   1. S/P laparoscopic cholecystectomy      10/26/20   10/26/2020  Incisions are clean, dry and intact or healing as expected   PLANS      Pathology reviewed with the patient who understands. All questions were answered. Patient Instructions   May return to full activity without restrictions. Follow up: Return for As needed. Instructed to call if any concerns. Andrwe Mccarthy is seen today for post-op follow-up. He is S/P L/S cholecystectomy 10/26/20. He is tolerating a regular diet, having regular bowel movements. Symptoms and activity have gradually improved compared to preoperative. The surgical site is clean and has no drainage. Pain is controlled without any narcotic pain medications. He has compliant with postoperative instructions.   Past Medical History  Past Medical History:   Diagnosis Date    History of meniscal tear     right knee-takes Naproxen    Obesity     Seasonal allergies      Past Surgical History  Past Surgical History:   Procedure Laterality Date    CHOLECYSTECTOMY, LAPAROSCOPIC N/A 10/26/2020    LAP CHOLECYSTECTOMY performed by Prieto Carreon DO at [facility]  2010    left hematoma lower-I&D Dr Fredi Rm  90's     Medications  Current Outpatient Medications   Medication Sig Dispense Refill    melatonin 3 MG TABS tablet Take 3 mg by mouth daily      naproxen (NAPROSYN) 500 MG tablet Take 1 tablet by mouth 2 times daily (with meals) 60 tablet 2     No current facility-administered medications for this visit. Allergies  is allergic to no known allergies. Social History   reports that he has never smoked. He has never used smokeless tobacco. He reports that he does not drink alcohol or use drugs. Health Screening Exams  Health Maintenance   Topic Date Due    HIV screen  06/16/1982    Shingles Vaccine (1 of 2) 06/16/2017    Flu vaccine (1) 09/01/2020    A1C test (Diabetic or Prediabetic)  09/10/2021    Colon cancer screen fecal DNA test (Cologuard)  11/05/2023    Lipid screen  09/10/2025    DTaP/Tdap/Td vaccine (8 - Td) 11/03/2025    Hepatitis A vaccine  Aged Out    Hepatitis B vaccine  Aged Out    Hib vaccine  Aged Out    Meningococcal (ACWY) vaccine  Aged Out    Pneumococcal 0-64 years Vaccine  Aged Out     Review of Systems  History obtained from the patient. Constitutional: Denies any fever, chills, fatigue. Wound: Denies any rash, skin color changes or wound problems. Resp: Denies any cough, shortness of breath. CV: Denies any chest pain, orthopnea or syncope. GI: Denies any nausea, vomiting, blood in the stool, constipation or diarrhea. Positive for incisional discomfort only. : Denies any hematuria, hesitancy or dysuria. OBJECTIVE    VITALS:  height is 6' 1\" (1.854 m) and weight is 418 lb (189.6 kg) (abnormal). His temporal temperature is 96.3 °F (35.7 °C). His blood pressure is 120/80 and his pulse is 50. His respiration is 18 and oxygen saturation is 93%. Pain Score:   0 - No pain  CONSTITUTIONAL: Alert and oriented times 3, no acute distress and cooperative to examination. SKIN: Skin color, texture, turgor normal.  INCISION: wound margins intact and healing well. No signs of infection. No drainage. HEENT: no scleral icterus  ABDOMEN: soft, nondistended, no masses or organomegaly. Bowel sounds normal. Laparoscopic scars. No sign of hematoma or seroma.  Tenderness to palpation incisional only.        Thank you for the interesting evaluation. Further recommendations as listed above.        Electronically signed by Leif Mccartney DO on 11/10/2020 at 12:17 PM

## 2020-11-10 NOTE — LETTER
Oscar Sorenson,   [address]. [address]  [place] [address]  [phone]     Pt Name: Sully Azevedo Record Number: [id number]  Date of Birth 1965   Today's Date: 11/10/2020    Lynette Gan was evaluated in the office today. My assessment and plans are listed below. ASSESSMENT      Diagnosis Orders   1. S/P laparoscopic cholecystectomy      10/26/20   10/26/2020  Incisions are clean, dry and intact or healing as expected  PLANS:     Pathology reviewed with the patient who understands. All questions were answered. Patient Instructions   May return to full activity without restrictions. Follow up: Return for As needed. Instructed to call if any concerns. If I can provide any additional assistance or you have any concerns, please feel free to contact me. Thank you for allowing to participate in the care of your patients. Sincerely,      Fredy Fisher.  Jaren Spence

## 2020-12-17 RX ORDER — NAPROXEN 500 MG/1
TABLET ORAL
Qty: 60 TABLET | Refills: 2 | Status: SHIPPED | OUTPATIENT
Start: 2020-12-17 | End: 2021-06-01

## 2021-05-07 ENCOUNTER — TELEPHONE (OUTPATIENT)
Dept: FAMILY MEDICINE CLINIC | Age: 56
End: 2021-05-07

## 2021-05-07 DIAGNOSIS — N20.0 NEPHROLITHIASIS: Primary | ICD-10-CM

## 2021-05-07 RX ORDER — KETOROLAC TROMETHAMINE 10 MG/1
10 TABLET, FILM COATED ORAL EVERY 6 HOURS PRN
Qty: 20 TABLET | Refills: 0 | Status: SHIPPED | OUTPATIENT
Start: 2021-05-07 | End: 2022-05-07

## 2021-05-07 NOTE — TELEPHONE ENCOUNTER
Pt notified and voiced understanding. Aware that urology will call him to schedule. Pt is positive for right side flank pain asking if a script can be called in for pain to [facility]. Pt has been taking Ibuprofen without relief. If no call back pt will check with [facility] around 12 noon today.

## 2021-05-07 NOTE — TELEPHONE ENCOUNTER
Pt has a hx of kidney stones positive today for flank pain, asking if there is someone he can be referred too to help manage this. Pt did see Dr. Be Barlow in the past and was happy with his services. Pt would like a call back with advice.

## 2021-05-14 ENCOUNTER — TELEPHONE (OUTPATIENT)
Dept: FAMILY MEDICINE CLINIC | Age: 56
End: 2021-05-14

## 2021-05-14 DIAGNOSIS — R10.9 FLANK PAIN: Primary | ICD-10-CM

## 2021-05-14 NOTE — TELEPHONE ENCOUNTER
Annie De La Rosa with [facility] calling regarding referral. They are requesting you order testing prior to appt for evaluation--CT Abdomen and Pelvis. Please advise. They are aware you are out of the office until Monday. Please call Annie De La Rosa at ph# [phone] Option #4.

## 2021-05-14 NOTE — TELEPHONE ENCOUNTER
Patient notified and understanding voiced. He will call [facility] and schedule his CT Scan due to his schedule. Will then call Urology and set up his appt.

## 2021-05-29 ENCOUNTER — HOSPITAL ENCOUNTER (OUTPATIENT)
Dept: CT IMAGING | Age: 56
Discharge: HOME | End: 2021-05-29
Payer: COMMERCIAL

## 2021-05-29 DIAGNOSIS — R10.9 FLANK PAIN: ICD-10-CM

## 2021-05-29 PROCEDURE — 74176 CT ABD & PELVIS W/O CONTRAST: CPT

## 2021-06-01 ENCOUNTER — OFFICE VISIT (OUTPATIENT)
Dept: UROLOGY | Age: 56
End: 2021-06-01
Payer: COMMERCIAL

## 2021-06-01 VITALS — HEIGHT: 73 IN | WEIGHT: 315 LBS | BODY MASS INDEX: 41.75 KG/M2

## 2021-06-01 DIAGNOSIS — R31.9 HEMATURIA, UNSPECIFIED TYPE: ICD-10-CM

## 2021-06-01 DIAGNOSIS — R10.9 RIGHT FLANK PAIN: ICD-10-CM

## 2021-06-01 DIAGNOSIS — N28.1 RENAL CYST: Primary | ICD-10-CM

## 2021-06-01 PROCEDURE — G8427 DOCREV CUR MEDS BY ELIG CLIN: HCPCS | Performed by: UROLOGY

## 2021-06-01 PROCEDURE — 1036F TOBACCO NON-USER: CPT | Performed by: UROLOGY

## 2021-06-01 PROCEDURE — 3017F COLORECTAL CA SCREEN DOC REV: CPT | Performed by: UROLOGY

## 2021-06-01 PROCEDURE — 99204 OFFICE O/P NEW MOD 45 MIN: CPT | Performed by: UROLOGY

## 2021-06-01 PROCEDURE — G8417 CALC BMI ABV UP PARAM F/U: HCPCS | Performed by: UROLOGY

## 2021-06-01 NOTE — PROGRESS NOTES
JUAN Butler MD        [address]  [address] [address]  Dept: [phone]  Dept Fax: [phone]: [address] [place] [address] Urology Office Note -     Patient:  Betsy Diaz. YOB: 1965    The patient is a 49 y.o. male who presents today for evaluation of the following problems: right flank pain  Chief Complaint   Patient presents with    Advice Only     NP right sided flank pain, ct prior. referred/consultation requested by Darien Gallagher DO.    HISTORY OF PRESENT ILLNESS:       Reports right sided flank pain. Recent onset. Unsure if he has had stones in past  Ct scan shows no stone. Could potentially be musculoskeletal    Renal cysts- found on ct scan. Discussed obtaining a MRI abd with contrast in six months. Denies any urinary issues or ED. Requested/reviewed records from Darien Gallagher DO office and/or outside physician/EMR    (Patient's old records have been requested, reviewed and pertinent findings summarized in today's note.)    Procedures Today: N/A      Last several PSA's:  No results found for: PSA    Last total testosterone:  No results found for: TESTOSTERONE    Urinalysis today:  No results found for this visit on 06/01/21. Last BUN and creatinine:  Lab Results   Component Value Date    BUN 22 09/10/2020     Lab Results   Component Value Date    CREATININE 1.02 09/10/2020         Imaging Reviewed during this Office Visit:   Capo Menchaca MD independently reviewed the images and verified the radiology reports from:    CT ABDOMEN PELVIS WO CONTRAST    Result Date: 5/29/2021  PROCEDURE: CT ABDOMEN PELVIS WO CONTRAST CLINICAL INFORMATION: Flank pain . Hematuria. COMPARISON: Abdominal radiographs dated 10/6/2020 and renal ultrasound dated 10/6/2020. TECHNIQUE: Axial 5 mm CT images were obtained through the abdomen and pelvis. No contrast was given. Coronal and sagittal reconstructions were created. All CT scans at this facility use dose modulation, iterative reconstruction, and/or weight-based dosing when appropriate to reduce radiation dose to as low as reasonably achievable. FINDINGS:  There is mild atelectasis at the right lung base. Visualized portions of the lungs are otherwise clear. The visualized portion of the unopacified heart is unremarkable. There is a micronodular subcapsular appearance of the liver. There is diffuse hypodensity without focal lesion identified. The gallbladder is surgically absent. Adrenal glands are unremarkable. There is a 4.4 cm hypodense cystic lesion at the superior pole of the left kidney. There is a 1.1 cm hypodense exophytic cystic lesion at the inferior pole of the right kidney. No nephrolithiasis is identified. No hydronephrosis is present. No ureterolithiasis is identified. The spleen is unremarkable. The pancreas is partially fatty replaced. No retroperitoneal or mesenteric lymphadenopathy is identified. There are multiple diverticula within the sigmoid colon without adjacent inflammation to suggest diverticulitis. Small bowel appears within normal limits. The bladder is nondistended. The prostate is normal in appearance. No free fluid is identified. There are mild degenerative changes of the lumbar spine. No suspicious osseous lesion is identified. 1. No evidence of nephroureterolithiasis. There are cystic lesions in both kidneys which are not fully characterized. Consider multiphase CT or MRI for characterization given the patient's symptoms. 2. Hepatic steatosis and cirrhosis. 3. Colonic diverticulosis. **This report has been created using voice recognition software. It may contain minor errors which are inherent in voice recognition technology. ** Final report electronically signed by Dr. Jamal Vicente MD on 5/29/2021 2:28 PM      PAST MEDICAL, FAMILY AND SOCIAL HISTORY:  Past Medical History: Diagnosis Date    History of meniscal tear     right knee-takes Naproxen    Obesity     Seasonal allergies      Past Surgical History:   Procedure Laterality Date    CHOLECYSTECTOMY, LAPAROSCOPIC N/A 10/26/2020    LAP CHOLECYSTECTOMY performed by Oscar Sorenson DO at [facility]  2010    left hematoma lower-I&D Dr Rinku Rust  80's     Family History   Problem Relation Age of Onset   [facility] COPD Mother     COPD Father     Heart Disease Father     Hypertension Father     No Known Problems Maternal Grandmother     Other Maternal Grandfather         aneursym    Cancer Paternal Grandmother         breast/uterine    No Known Problems Paternal Grandfather      Outpatient Medications Marked as Taking for the 6/1/21 encounter (Office Visit) with uOsmane Sylvester MD   Medication Sig Dispense Refill    melatonin 3 MG TABS tablet Take 3 mg by mouth daily         No known allergies  Social History     Tobacco Use   Smoking Status Never Smoker   Smokeless Tobacco Never Used      (If patient a smoker, smoking cessation counseling offered)   Social History     Substance and Sexual Activity   Alcohol Use No       REVIEW OF SYSTEMS:  Constitutional: negative  Eyes: negative  Respiratory: negative  Cardiovascular: negative  Gastrointestinal: negative  Genitourinary: see HPI  Musculoskeletal: negative  Skin: negative   Neurological: negative  Hematological/Lymphatic: negative  Psychological: negative        Physical Exam:    This a 49 y.o. male  There were no vitals filed for this visit. Body mass index is 55.41 kg/m². Constitutional: Patient in no acute distress;       Assessment and Plan        1. Renal cyst    2. Right flank pain               Plan:        Right flank pain-ct scan shows no stone.  No obvious source of pain  Incompletely characterized renal cysts- will follow with MRI in six months    Prescriptions Ordered:  No orders of the defined types were placed in this encounter. Orders Placed:  No orders of the defined types were placed in this encounter.            Ludy Crawford MD

## 2021-06-02 ENCOUNTER — TELEPHONE (OUTPATIENT)
Dept: UROLOGY | Age: 56
End: 2021-06-02

## 2021-06-02 DIAGNOSIS — N28.1 RENAL CYST: Primary | ICD-10-CM

## 2021-06-02 NOTE — TELEPHONE ENCOUNTER
Patient scheduled for MRI Abdomen W WO  at [facility] MR on 11/23/21 arrival of 12:30 pm for a 1:00 pm scan time with NPO 4 hours. Patient advised of instructions.   Order mailed/given to patient

## 2023-11-13 ENCOUNTER — TELEPHONE (OUTPATIENT)
Dept: FAMILY MEDICINE CLINIC | Age: 58
End: 2023-11-13

## 2023-11-13 DIAGNOSIS — Z00.00 WELL ADULT HEALTH CHECK: Primary | ICD-10-CM

## 2023-11-13 NOTE — TELEPHONE ENCOUNTER
Called and updated the patient on lab order and that they require fasting. Order faxed to [facility] [address] per the patients request.  [phone].

## 2023-11-13 NOTE — TELEPHONE ENCOUNTER
----- Message from Regla Berg sent at 11/13/2023 12:06 PM EST -----  Subject: Referral Request    Reason for referral request? labs  Provider patient wants to be referred to(if known):     Provider Phone Number(if known):     Additional Information for Provider? routine labs requested for schedule   physical 11/28/2023  ---------------------------------------------------------------------------  --------------  Mark SANTA    [phone]; OK to leave message on voicemail  ---------------------------------------------------------------------------  --------------

## 2023-11-25 ASSESSMENT — PATIENT HEALTH QUESTIONNAIRE - PHQ9
1. LITTLE INTEREST OR PLEASURE IN DOING THINGS: NOT AT ALL
1. LITTLE INTEREST OR PLEASURE IN DOING THINGS: 0
2. FEELING DOWN, DEPRESSED OR HOPELESS: 0
SUM OF ALL RESPONSES TO PHQ9 QUESTIONS 1 & 2: 0
SUM OF ALL RESPONSES TO PHQ QUESTIONS 1-9: 0
SUM OF ALL RESPONSES TO PHQ9 QUESTIONS 1 & 2: 0
SUM OF ALL RESPONSES TO PHQ QUESTIONS 1-9: 0
SUM OF ALL RESPONSES TO PHQ QUESTIONS 1-9: 0
2. FEELING DOWN, DEPRESSED OR HOPELESS: NOT AT ALL
SUM OF ALL RESPONSES TO PHQ QUESTIONS 1-9: 0

## 2023-11-27 SDOH — HEALTH STABILITY: PHYSICAL HEALTH: ON AVERAGE, HOW MANY DAYS PER WEEK DO YOU ENGAGE IN MODERATE TO STRENUOUS EXERCISE (LIKE A BRISK WALK)?: 0 DAYS

## 2023-11-27 SDOH — HEALTH STABILITY: PHYSICAL HEALTH: ON AVERAGE, HOW MANY MINUTES DO YOU ENGAGE IN EXERCISE AT THIS LEVEL?: 0 MIN

## 2023-11-28 ENCOUNTER — OFFICE VISIT (OUTPATIENT)
Dept: FAMILY MEDICINE CLINIC | Age: 58
End: 2023-11-28
Payer: COMMERCIAL

## 2023-11-28 VITALS
DIASTOLIC BLOOD PRESSURE: 90 MMHG | HEART RATE: 92 BPM | WEIGHT: 315 LBS | RESPIRATION RATE: 18 BRPM | SYSTOLIC BLOOD PRESSURE: 152 MMHG | BODY MASS INDEX: 42.66 KG/M2 | HEIGHT: 72 IN

## 2023-11-28 DIAGNOSIS — E78.00 PURE HYPERCHOLESTEROLEMIA: ICD-10-CM

## 2023-11-28 DIAGNOSIS — G47.33 OSA ON CPAP: ICD-10-CM

## 2023-11-28 DIAGNOSIS — E66.01 MORBID OBESITY (HCC): ICD-10-CM

## 2023-11-28 DIAGNOSIS — E11.9 NEW ONSET OF TYPE 2 DIABETES MELLITUS IN PEDIATRIC PATIENT (HCC): Primary | ICD-10-CM

## 2023-11-28 DIAGNOSIS — Z12.11 SCREEN FOR COLON CANCER: ICD-10-CM

## 2023-11-28 DIAGNOSIS — I10 HYPERTENSION, UNSPECIFIED TYPE: ICD-10-CM

## 2023-11-28 PROBLEM — R03.0 FINDING OF ABOVE NORMAL BLOOD PRESSURE: Status: ACTIVE | Noted: 2023-11-28

## 2023-11-28 LAB
ABSOLUTE BASO #: 0.04 K/UL (ref 0–0.2)
ABSOLUTE EOS #: 0.21 K/UL (ref 0–0.5)
ABSOLUTE LYMPH #: 2.6 K/UL (ref 1–4)
ABSOLUTE MONO #: 0.49 K/UL (ref 0.2–1)
ABSOLUTE NEUT #: 3.83 K/UL (ref 1.5–7.5)
ALBUMIN SERPL-MCNC: 4 G/DL (ref 3.5–5.2)
ALK PHOSPHATASE: 124 U/L (ref 40–123)
ALT SERPL-CCNC: 46 U/L (ref 5–50)
ANION GAP SERPL CALCULATED.3IONS-SCNC: 13 MEQ/L (ref 7–16)
AST SERPL-CCNC: 58 U/L (ref 9–50)
AVERAGE GLUCOSE: 166 MG/DL
BASOPHILS RELATIVE PERCENT: 0.6 %
BILIRUB SERPL-MCNC: 1.2 MG/DL
BUN BLDV-MCNC: 13 MG/DL (ref 6–20)
CALCIUM SERPL-MCNC: 9.6 MG/DL (ref 8.5–10.5)
CHLORIDE BLD-SCNC: 104 MEQ/L (ref 95–107)
CHOLESTEROL/HDL RATIO: 3 RATIO
CHOLESTEROL: 204 MG/DL
CO2: 22 MEQ/L (ref 19–31)
CREAT SERPL-MCNC: 0.85 MG/DL (ref 0.8–1.4)
EGFR IF NONAFRICAN AMERICAN: 102 ML/MIN/1.73
EOSINOPHILS RELATIVE PERCENT: 2.9 %
GLUCOSE: 150 MG/DL (ref 70–99)
HBA1C MFR BLD: 7.4 % (ref 4.2–5.6)
HCT VFR BLD CALC: 48.2 % (ref 40–51)
HDLC SERPL-MCNC: 67 MG/DL
HEMOGLOBIN: 16.2 G/DL (ref 13.5–17)
LDL CHOLESTEROL CALCULATED: 119 MG/DL
LDL/HDL RATIO: 1.8 RATIO
LYMPHOCYTE %: 36.2 %
MCH RBC QN AUTO: 29.2 PG (ref 25–33)
MCHC RBC AUTO-ENTMCNC: 33.6 G/DL (ref 31–36)
MCV RBC AUTO: 86.8 FL (ref 80–99)
MONOCYTES # BLD: 6.8 %
NEUTROPHILS RELATIVE PERCENT: 53.2 %
PDW BLD-RTO: 13.1 % (ref 11.5–15)
PLATELETS: 136 K/UL (ref 130–400)
PMV BLD AUTO: 12 FL (ref 9.3–13)
POTASSIUM SERPL-SCNC: 4.1 MEQ/L (ref 3.5–5.4)
PSA, ULTRASENSITIVE: 0.74 NG/ML
RBC: 5.55 M/UL (ref 4.5–6.1)
SODIUM BLD-SCNC: 139 MEQ/L (ref 133–146)
TOTAL PROTEIN: 6.8 G/DL (ref 6.1–8.3)
TRIGL SERPL-MCNC: 90 MG/DL
TSH SERPL DL<=0.05 MIU/L-ACNC: 2.28 UIU/ML (ref 0.4–4.1)
VLDLC SERPL CALC-MCNC: 18 MG/DL
WBC: 7.2 K/UL (ref 3.5–11)

## 2023-11-28 PROCEDURE — 3074F SYST BP LT 130 MM HG: CPT | Performed by: FAMILY MEDICINE

## 2023-11-28 PROCEDURE — G8427 DOCREV CUR MEDS BY ELIG CLIN: HCPCS | Performed by: FAMILY MEDICINE

## 2023-11-28 PROCEDURE — 3078F DIAST BP <80 MM HG: CPT | Performed by: FAMILY MEDICINE

## 2023-11-28 PROCEDURE — 1036F TOBACCO NON-USER: CPT | Performed by: FAMILY MEDICINE

## 2023-11-28 PROCEDURE — 99204 OFFICE O/P NEW MOD 45 MIN: CPT | Performed by: FAMILY MEDICINE

## 2023-11-28 PROCEDURE — G8484 FLU IMMUNIZE NO ADMIN: HCPCS | Performed by: FAMILY MEDICINE

## 2023-11-28 PROCEDURE — 2022F DILAT RTA XM EVC RTNOPTHY: CPT | Performed by: FAMILY MEDICINE

## 2023-11-28 PROCEDURE — 3017F COLORECTAL CA SCREEN DOC REV: CPT | Performed by: FAMILY MEDICINE

## 2023-11-28 PROCEDURE — 3051F HG A1C>EQUAL 7.0%<8.0%: CPT | Performed by: FAMILY MEDICINE

## 2023-11-28 PROCEDURE — G8417 CALC BMI ABV UP PARAM F/U: HCPCS | Performed by: FAMILY MEDICINE

## 2023-11-28 RX ORDER — LISINOPRIL 10 MG/1
10 TABLET ORAL DAILY
Qty: 90 TABLET | Refills: 0 | Status: SHIPPED | OUTPATIENT
Start: 2023-11-28

## 2023-11-28 RX ORDER — ROSUVASTATIN CALCIUM 10 MG/1
10 TABLET, COATED ORAL NIGHTLY
Qty: 90 TABLET | Refills: 0 | Status: SHIPPED | OUTPATIENT
Start: 2023-11-28

## 2023-11-28 RX ORDER — EMPAGLIFLOZIN AND METFORMIN HYDROCHLORIDE 5; 500 MG/1; MG/1
TABLET ORAL
Qty: 60 TABLET | Refills: 2 | Status: SHIPPED | OUTPATIENT
Start: 2023-11-28

## 2023-11-28 SDOH — ECONOMIC STABILITY: HOUSING INSECURITY
IN THE LAST 12 MONTHS, WAS THERE A TIME WHEN YOU DID NOT HAVE A STEADY PLACE TO SLEEP OR SLEPT IN A SHELTER (INCLUDING NOW)?: NO

## 2023-11-28 SDOH — ECONOMIC STABILITY: INCOME INSECURITY: HOW HARD IS IT FOR YOU TO PAY FOR THE VERY BASICS LIKE FOOD, HOUSING, MEDICAL CARE, AND HEATING?: NOT HARD AT ALL

## 2023-11-28 SDOH — ECONOMIC STABILITY: FOOD INSECURITY: WITHIN THE PAST 12 MONTHS, THE FOOD YOU BOUGHT JUST DIDN'T LAST AND YOU DIDN'T HAVE MONEY TO GET MORE.: NEVER TRUE

## 2023-11-28 SDOH — ECONOMIC STABILITY: FOOD INSECURITY: WITHIN THE PAST 12 MONTHS, YOU WORRIED THAT YOUR FOOD WOULD RUN OUT BEFORE YOU GOT MONEY TO BUY MORE.: NEVER TRUE

## 2023-11-29 ASSESSMENT — ENCOUNTER SYMPTOMS
GASTROINTESTINAL NEGATIVE: 1
RESPIRATORY NEGATIVE: 1

## 2023-12-16 LAB — NONINV COLON CA DNA+OCC BLD SCRN STL QL: POSITIVE

## 2023-12-26 DIAGNOSIS — E11.9 NEW ONSET OF TYPE 2 DIABETES MELLITUS IN PEDIATRIC PATIENT (HCC): ICD-10-CM

## 2023-12-26 RX ORDER — EMPAGLIFLOZIN AND METFORMIN HYDROCHLORIDE 5; 500 MG/1; MG/1
TABLET ORAL
Qty: 180 TABLET | Refills: 3 | Status: SHIPPED | OUTPATIENT
Start: 2023-12-26

## 2023-12-26 NOTE — TELEPHONE ENCOUNTER
Fax received from pharmacy for refill of Synjardy 5/500mg for 90 days. Please refill if appropriate.

## 2024-02-26 ENCOUNTER — TELEPHONE (OUTPATIENT)
Dept: FAMILY MEDICINE CLINIC | Age: 59
End: 2024-02-26

## 2024-02-26 DIAGNOSIS — I10 HYPERTENSION, UNSPECIFIED TYPE: ICD-10-CM

## 2024-02-26 DIAGNOSIS — E78.00 PURE HYPERCHOLESTEROLEMIA: ICD-10-CM

## 2024-02-26 RX ORDER — LISINOPRIL 10 MG/1
10 TABLET ORAL DAILY
Qty: 90 TABLET | Refills: 0 | Status: SHIPPED | OUTPATIENT
Start: 2024-02-26

## 2024-02-26 RX ORDER — ROSUVASTATIN CALCIUM 10 MG/1
10 TABLET, COATED ORAL
Qty: 90 TABLET | Refills: 0 | Status: SHIPPED | OUTPATIENT
Start: 2024-02-26

## 2024-02-26 NOTE — TELEPHONE ENCOUNTER
Patient requesting lab orders be faxed to lab in [place].  Patient will call back with fax# for lab orders to be faxed.

## 2024-03-03 ASSESSMENT — PATIENT HEALTH QUESTIONNAIRE - PHQ9
SUM OF ALL RESPONSES TO PHQ QUESTIONS 1-9: 0
2. FEELING DOWN, DEPRESSED OR HOPELESS: 0
1. LITTLE INTEREST OR PLEASURE IN DOING THINGS: 0
SUM OF ALL RESPONSES TO PHQ9 QUESTIONS 1 & 2: 0
1. LITTLE INTEREST OR PLEASURE IN DOING THINGS: NOT AT ALL
SUM OF ALL RESPONSES TO PHQ QUESTIONS 1-9: 0
SUM OF ALL RESPONSES TO PHQ9 QUESTIONS 1 & 2: 0
2. FEELING DOWN, DEPRESSED OR HOPELESS: NOT AT ALL

## 2024-03-04 LAB
ALBUMIN SERPL-MCNC: 4.1 G/DL (ref 3.5–5.2)
ALK PHOSPHATASE: 109 U/L (ref 40–123)
ALT SERPL-CCNC: 44 U/L (ref 5–50)
ANION GAP SERPL CALCULATED.3IONS-SCNC: 11 MEQ/L (ref 7–16)
AST SERPL-CCNC: 52 U/L (ref 9–50)
BILIRUB SERPL-MCNC: 0.8 MG/DL
BUN BLDV-MCNC: 16 MG/DL (ref 6–20)
CALCIUM SERPL-MCNC: 8.9 MG/DL (ref 8.5–10.5)
CHLORIDE BLD-SCNC: 110 MEQ/L (ref 95–107)
CHOLESTEROL/HDL RATIO: 2.7 RATIO
CHOLESTEROL: 151 MG/DL
CO2: 21 MEQ/L (ref 19–31)
CREAT SERPL-MCNC: 0.79 MG/DL (ref 0.8–1.4)
EGFR IF NONAFRICAN AMERICAN: 104 ML/MIN/1.73
ESTIMATED AVERAGE GLUCOSE: 137 MG/DL
GLUCOSE: 124 MG/DL (ref 70–99)
HBA1C MFR BLD: 6.4 % (ref 4.2–5.6)
HDLC SERPL-MCNC: 56 MG/DL
LDL CHOLESTEROL CALCULATED: 81 MG/DL
LDL/HDL RATIO: 1.4 RATIO
POTASSIUM SERPL-SCNC: 3.9 MEQ/L (ref 3.5–5.4)
SODIUM BLD-SCNC: 142 MEQ/L (ref 133–146)
TOTAL PROTEIN: 6.5 G/DL (ref 6.1–8.3)
TRIGL SERPL-MCNC: 71 MG/DL
VLDLC SERPL CALC-MCNC: 14 MG/DL

## 2024-03-05 ENCOUNTER — OFFICE VISIT (OUTPATIENT)
Dept: FAMILY MEDICINE CLINIC | Age: 59
End: 2024-03-05
Payer: COMMERCIAL

## 2024-03-05 VITALS
DIASTOLIC BLOOD PRESSURE: 66 MMHG | RESPIRATION RATE: 18 BRPM | SYSTOLIC BLOOD PRESSURE: 124 MMHG | WEIGHT: 315 LBS | HEART RATE: 72 BPM | BODY MASS INDEX: 54.25 KG/M2

## 2024-03-05 DIAGNOSIS — E11.9 CONTROLLED TYPE 2 DIABETES MELLITUS WITHOUT COMPLICATION, WITHOUT LONG-TERM CURRENT USE OF INSULIN (HCC): Primary | ICD-10-CM

## 2024-03-05 DIAGNOSIS — I10 HYPERTENSION, UNSPECIFIED TYPE: ICD-10-CM

## 2024-03-05 DIAGNOSIS — E66.01 MORBID OBESITY (HCC): ICD-10-CM

## 2024-03-05 DIAGNOSIS — G47.33 OSA ON CPAP: ICD-10-CM

## 2024-03-05 DIAGNOSIS — E78.00 PURE HYPERCHOLESTEROLEMIA: ICD-10-CM

## 2024-03-05 PROCEDURE — 3078F DIAST BP <80 MM HG: CPT | Performed by: FAMILY MEDICINE

## 2024-03-05 PROCEDURE — 99214 OFFICE O/P EST MOD 30 MIN: CPT | Performed by: FAMILY MEDICINE

## 2024-03-05 PROCEDURE — G8484 FLU IMMUNIZE NO ADMIN: HCPCS | Performed by: FAMILY MEDICINE

## 2024-03-05 PROCEDURE — 1036F TOBACCO NON-USER: CPT | Performed by: FAMILY MEDICINE

## 2024-03-05 PROCEDURE — 3017F COLORECTAL CA SCREEN DOC REV: CPT | Performed by: FAMILY MEDICINE

## 2024-03-05 PROCEDURE — G8427 DOCREV CUR MEDS BY ELIG CLIN: HCPCS | Performed by: FAMILY MEDICINE

## 2024-03-05 PROCEDURE — G8417 CALC BMI ABV UP PARAM F/U: HCPCS | Performed by: FAMILY MEDICINE

## 2024-03-05 PROCEDURE — 3044F HG A1C LEVEL LT 7.0%: CPT | Performed by: FAMILY MEDICINE

## 2024-03-05 PROCEDURE — 3074F SYST BP LT 130 MM HG: CPT | Performed by: FAMILY MEDICINE

## 2024-03-05 PROCEDURE — G2211 COMPLEX E/M VISIT ADD ON: HCPCS | Performed by: FAMILY MEDICINE

## 2024-03-05 PROCEDURE — 2022F DILAT RTA XM EVC RTNOPTHY: CPT | Performed by: FAMILY MEDICINE

## 2024-03-05 RX ORDER — ROSUVASTATIN CALCIUM 20 MG/1
20 TABLET, COATED ORAL NIGHTLY
Qty: 90 TABLET | Refills: 3 | Status: SHIPPED | OUTPATIENT
Start: 2024-03-05

## 2024-03-05 RX ORDER — LISINOPRIL 10 MG/1
10 TABLET ORAL DAILY
Qty: 90 TABLET | Refills: 3 | Status: SHIPPED | OUTPATIENT
Start: 2024-03-05

## 2024-03-05 RX ORDER — ROSUVASTATIN CALCIUM 10 MG/1
10 TABLET, COATED ORAL
Qty: 90 TABLET | Refills: 2 | Status: CANCELLED | OUTPATIENT
Start: 2024-03-05

## 2024-03-05 ASSESSMENT — ENCOUNTER SYMPTOMS
GASTROINTESTINAL NEGATIVE: 1
RESPIRATORY NEGATIVE: 1

## 2024-03-05 NOTE — PROGRESS NOTES
Fernando Green Jr. (:  1965) is a 58 y.o. male,Established patient, here for evaluation of the following chief complaint(s):  3 Month Follow-Up, Diabetes, Medication Refill, and Discuss Labs          Subjective   SUBJECTIVE/OBJECTIVE:  HPI  Chief Complaint   Patient presents with    3 Month Follow-Up    Diabetes    Medication Refill    Discuss Labs     3 month eval.    Sugars look better on the Synjardy.  Lab Results   Component Value Date    LABA1C 6.4 (H) 2024    LABA1C 7.4 (H) 2023    LABA1C 6.1 09/10/2020     Lab Results   Component Value Date    LDLCALC 81 2024    CREATININE 0.79 (L) 2024     BP Readings from Last 3 Encounters:   24 124/66   23 (!) 152/90   11/10/20 120/80     Weight is down 17 lbs.  Wt Readings from Last 3 Encounters:   24 (!) 181.4 kg (400 lb)   23 (!) 189.2 kg (417 lb 3.2 oz)   21 (!) 190.5 kg (420 lb)       Patient Active Problem List   Diagnosis    CAMI on CPAP    Morbid obesity (HCC)    IFG (impaired fasting glucose)    Hyperlipidemia with target LDL less than 100    Renal cyst    Primary insomnia    Finding of above normal blood pressure       Current Outpatient Medications   Medication Sig Dispense Refill    lisinopril (PRINIVIL;ZESTRIL) 10 MG tablet Take 1 tablet by mouth daily 90 tablet 3    rosuvastatin (CRESTOR) 20 MG tablet Take 1 tablet by mouth at bedtime 90 tablet 3    Empagliflozin-metFORMIN HCl (SYNJARDY) 5-500 MG TABS Take 1 tablet  tablet 3     No current facility-administered medications for this visit.       Past Surgical History:   Procedure Laterality Date    CHOLECYSTECTOMY, LAPAROSCOPIC N/A 10/26/2020    LAP CHOLECYSTECTOMY performed by Colten Spence DO at [facility] OR    OTHER SURGICAL HISTORY      left hematoma lower-I&D Dr René BRAVODOM TOOTH EXTRACTION  's       Review of Systems   Constitutional: Negative.    HENT: Negative.     Respiratory: Negative.     Cardiovascular: Negative.

## 2024-05-23 DIAGNOSIS — E78.00 PURE HYPERCHOLESTEROLEMIA: ICD-10-CM

## 2024-05-23 RX ORDER — ROSUVASTATIN CALCIUM 10 MG/1
10 TABLET, COATED ORAL
Qty: 90 TABLET | Refills: 0 | OUTPATIENT
Start: 2024-05-23

## 2024-09-04 LAB
CREATINE, URINE: 269.2 MG/DL
ESTIMATED AVERAGE GLUCOSE: 140 MG/DL
HBA1C MFR BLD: 6.5 % (ref 4.2–5.6)
MICROALBUMIN/CREAT 24H UR: 3.6 MG/DL
MICROALBUMIN/CREAT UR-RTO: 13 MG/G

## 2024-09-06 ENCOUNTER — OFFICE VISIT (OUTPATIENT)
Dept: FAMILY MEDICINE CLINIC | Age: 59
End: 2024-09-06

## 2024-09-06 VITALS
HEIGHT: 72 IN | RESPIRATION RATE: 16 BRPM | SYSTOLIC BLOOD PRESSURE: 134 MMHG | BODY MASS INDEX: 42.66 KG/M2 | HEART RATE: 80 BPM | DIASTOLIC BLOOD PRESSURE: 76 MMHG | WEIGHT: 315 LBS

## 2024-09-06 DIAGNOSIS — E66.01 MORBID OBESITY (HCC): ICD-10-CM

## 2024-09-06 DIAGNOSIS — E11.9 CONTROLLED TYPE 2 DIABETES MELLITUS WITHOUT COMPLICATION, WITHOUT LONG-TERM CURRENT USE OF INSULIN (HCC): Primary | ICD-10-CM

## 2024-09-06 DIAGNOSIS — G47.33 OSA ON CPAP: ICD-10-CM

## 2024-09-06 DIAGNOSIS — Z12.5 SCREENING FOR PROSTATE CANCER: ICD-10-CM

## 2024-09-06 DIAGNOSIS — I10 HYPERTENSION, UNSPECIFIED TYPE: ICD-10-CM

## 2024-09-06 DIAGNOSIS — E78.00 PURE HYPERCHOLESTEROLEMIA: ICD-10-CM

## 2024-09-06 RX ORDER — TIRZEPATIDE 2.5 MG/.5ML
2.5 INJECTION, SOLUTION SUBCUTANEOUS WEEKLY
Qty: 2 ML | Refills: 0 | Status: SHIPPED | OUTPATIENT
Start: 2024-09-06

## 2024-09-06 ASSESSMENT — ENCOUNTER SYMPTOMS
RESPIRATORY NEGATIVE: 1
GASTROINTESTINAL NEGATIVE: 1

## 2024-09-06 NOTE — PROGRESS NOTES
Fernando Green Jr. (:  1965) is a 59 y.o. male,Established patient, here for evaluation of the following chief complaint(s):  6 Month Follow-Up and Diabetes          Subjective   SUBJECTIVE/OBJECTIVE:  HPI  Chief Complaint   Patient presents with    6 Month Follow-Up    Diabetes     6 month eval.    Sugars doing well on the Synjardy.  Lab Results   Component Value Date    LABA1C 6.5 (H) 2024    LABA1C 6.4 (H) 2024    LABA1C 7.4 (H) 2023     Lab Results   Component Value Date    MALBCR 13 2024    CREATININE 0.79 (L) 2024       BP Readings from Last 3 Encounters:   24 134/76   24 124/66   23 (!) 152/90     Limited wt loss.  Wt Readings from Last 3 Encounters:   24 (!) 180.4 kg (397 lb 9.6 oz)   24 (!) 181.4 kg (400 lb)   23 (!) 189.2 kg (417 lb 3.2 oz)       Patient Active Problem List   Diagnosis    CAMI on CPAP    Morbid obesity (HCC)    IFG (impaired fasting glucose)    Hyperlipidemia with target LDL less than 100    Renal cyst    Primary insomnia    Finding of above normal blood pressure       Current Outpatient Medications   Medication Sig Dispense Refill    Tirzepatide (MOUNJARO) 2.5 MG/0.5ML SOPN SC injection Inject 0.5 mLs into the skin once a week 2 mL 0    lisinopril (PRINIVIL;ZESTRIL) 10 MG tablet Take 1 tablet by mouth daily 90 tablet 3    rosuvastatin (CRESTOR) 20 MG tablet Take 1 tablet by mouth at bedtime 90 tablet 3    Empagliflozin-metFORMIN HCl (SYNJARDY) 5-500 MG TABS Take 1 tablet  tablet 3     No current facility-administered medications for this visit.       Past Surgical History:   Procedure Laterality Date    CHOLECYSTECTOMY, LAPAROSCOPIC N/A 10/26/2020    LAP CHOLECYSTECTOMY performed by Colten Spence DO at [facility] OR    OTHER SURGICAL HISTORY      left hematoma lower-I&D Dr René BRAVODOM TOOTH EXTRACTION  90's       Review of Systems   Constitutional: Negative.    HENT: Negative.     Respiratory: Negative.

## 2024-09-25 DIAGNOSIS — E11.9 CONTROLLED TYPE 2 DIABETES MELLITUS WITHOUT COMPLICATION, WITHOUT LONG-TERM CURRENT USE OF INSULIN (HCC): ICD-10-CM

## 2024-09-25 RX ORDER — TIRZEPATIDE 2.5 MG/.5ML
2.5 INJECTION, SOLUTION SUBCUTANEOUS WEEKLY
Qty: 2 ML | Refills: 0 | Status: CANCELLED | OUTPATIENT
Start: 2024-09-25

## 2024-09-25 RX ORDER — TIRZEPATIDE 5 MG/.5ML
5 INJECTION, SOLUTION SUBCUTANEOUS WEEKLY
Qty: 2 ML | Refills: 0 | Status: SHIPPED | OUTPATIENT
Start: 2024-09-25

## 2024-09-27 ENCOUNTER — TELEPHONE (OUTPATIENT)
Dept: FAMILY MEDICINE CLINIC | Age: 59
End: 2024-09-27

## 2024-09-27 DIAGNOSIS — R19.5 POSITIVE COLORECTAL CANCER SCREENING USING COLOGUARD TEST: Primary | ICD-10-CM

## 2024-09-27 NOTE — TELEPHONE ENCOUNTER
I spoke with patient and states he is seeing them for positive cologuard test.  Cologuard screening was positive on 12/11/2023

## 2024-09-27 NOTE — TELEPHONE ENCOUNTER
Fax received from [facility] for appt on 10/4/24 at 11am with Brenda Sesay.  Please generate referral

## 2024-11-06 DIAGNOSIS — E11.9 CONTROLLED TYPE 2 DIABETES MELLITUS WITHOUT COMPLICATION, WITHOUT LONG-TERM CURRENT USE OF INSULIN (HCC): Primary | ICD-10-CM

## 2024-11-06 DIAGNOSIS — E11.9 CONTROLLED TYPE 2 DIABETES MELLITUS WITHOUT COMPLICATION, WITHOUT LONG-TERM CURRENT USE OF INSULIN (HCC): ICD-10-CM

## 2024-11-06 RX ORDER — TIRZEPATIDE 7.5 MG/.5ML
7.5 INJECTION, SOLUTION SUBCUTANEOUS WEEKLY
Qty: 2 ML | Refills: 0 | Status: SHIPPED | OUTPATIENT
Start: 2024-11-06

## 2024-11-06 RX ORDER — TIRZEPATIDE 5 MG/.5ML
INJECTION, SOLUTION SUBCUTANEOUS
OUTPATIENT
Start: 2024-11-06

## 2024-12-08 DIAGNOSIS — E11.9 CONTROLLED TYPE 2 DIABETES MELLITUS WITHOUT COMPLICATION, WITHOUT LONG-TERM CURRENT USE OF INSULIN (HCC): ICD-10-CM

## 2024-12-09 RX ORDER — TIRZEPATIDE 7.5 MG/.5ML
7.5 INJECTION, SOLUTION SUBCUTANEOUS WEEKLY
Qty: 2 ML | Refills: 0 | OUTPATIENT
Start: 2024-12-09

## 2024-12-09 RX ORDER — TIRZEPATIDE 7.5 MG/.5ML
INJECTION, SOLUTION SUBCUTANEOUS
Qty: 2 ML | Refills: 5 | Status: SHIPPED | OUTPATIENT
Start: 2024-12-09

## 2025-01-03 NOTE — PATIENT INSTRUCTIONS
You may receive a survey regarding the care you received during your visit. Your input is valuable to us. We encourage you to complete and return your survey. We hope you will choose us in the future for your healthcare needs. 89 yr old femaleFrom home, ambulatory and independent, with hx of HTN, DM, HLD, CHF, CVA (> 15 years on plavix) presents c/o cough x 4 days and sob on day of admission,Flu+,PAF with RVR,hyponatremia.  1.Flu-Tamiflu.  2.PAF-eliquis,low dose b blocker and multaq.Tele monitoring.  3.CVA-eliquis,statin.  4.Hyponatremia-d/c demdex,IVF.  5.HTN-d/c norvasc,cont cozaar.  6.DM-Insulin.  7.Lipid d/o-statin.  8.PPI.

## 2025-01-13 ENCOUNTER — TELEPHONE (OUTPATIENT)
Dept: FAMILY MEDICINE CLINIC | Age: 60
End: 2025-01-13

## 2025-01-13 NOTE — TELEPHONE ENCOUNTER
Patient calling and requesting to increase his Mounjaro to higher dose.  Tolerating current dose without any problems.  Please advise  Pharmacy is [facility].

## 2025-01-14 DIAGNOSIS — E11.9 CONTROLLED TYPE 2 DIABETES MELLITUS WITHOUT COMPLICATION, WITHOUT LONG-TERM CURRENT USE OF INSULIN (HCC): Primary | ICD-10-CM

## 2025-01-14 RX ORDER — TIRZEPATIDE 10 MG/.5ML
10 INJECTION, SOLUTION SUBCUTANEOUS WEEKLY
Qty: 2 ML | Refills: 1 | Status: SHIPPED | OUTPATIENT
Start: 2025-01-14

## 2025-01-14 NOTE — TELEPHONE ENCOUNTER
Patient calling due to pharmacy unable to dispense Mounjaro to patient due to instructions on medication due not match the current dose.  Please advise and resend Rx.

## 2025-02-03 DIAGNOSIS — E78.00 PURE HYPERCHOLESTEROLEMIA: ICD-10-CM

## 2025-02-03 RX ORDER — ROSUVASTATIN CALCIUM 20 MG/1
20 TABLET, COATED ORAL
Qty: 90 TABLET | Refills: 4 | Status: SHIPPED | OUTPATIENT
Start: 2025-02-03

## 2025-02-06 ENCOUNTER — OFFICE VISIT (OUTPATIENT)
Dept: FAMILY MEDICINE CLINIC | Age: 60
End: 2025-02-06
Payer: COMMERCIAL

## 2025-02-06 ENCOUNTER — HOSPITAL ENCOUNTER (OUTPATIENT)
Dept: ULTRASOUND IMAGING | Age: 60
Discharge: HOME | End: 2025-02-06
Attending: FAMILY MEDICINE
Payer: COMMERCIAL

## 2025-02-06 VITALS
HEART RATE: 76 BPM | DIASTOLIC BLOOD PRESSURE: 84 MMHG | WEIGHT: 315 LBS | RESPIRATION RATE: 16 BRPM | BODY MASS INDEX: 50.78 KG/M2 | SYSTOLIC BLOOD PRESSURE: 132 MMHG

## 2025-02-06 DIAGNOSIS — E11.9 CONTROLLED TYPE 2 DIABETES MELLITUS WITHOUT COMPLICATION, WITHOUT LONG-TERM CURRENT USE OF INSULIN (HCC): ICD-10-CM

## 2025-02-06 DIAGNOSIS — N50.812 PAIN IN LEFT TESTICLE: ICD-10-CM

## 2025-02-06 DIAGNOSIS — E66.01 MORBID OBESITY: ICD-10-CM

## 2025-02-06 DIAGNOSIS — R19.09 MASS OF LEFT INGUINAL REGION: Primary | ICD-10-CM

## 2025-02-06 DIAGNOSIS — R19.09 MASS OF LEFT INGUINAL REGION: ICD-10-CM

## 2025-02-06 PROCEDURE — 3017F COLORECTAL CA SCREEN DOC REV: CPT | Performed by: FAMILY MEDICINE

## 2025-02-06 PROCEDURE — 76882 US LMTD JT/FCL EVL NVASC XTR: CPT

## 2025-02-06 PROCEDURE — G8417 CALC BMI ABV UP PARAM F/U: HCPCS | Performed by: FAMILY MEDICINE

## 2025-02-06 PROCEDURE — 2022F DILAT RTA XM EVC RTNOPTHY: CPT | Performed by: FAMILY MEDICINE

## 2025-02-06 PROCEDURE — 99214 OFFICE O/P EST MOD 30 MIN: CPT | Performed by: FAMILY MEDICINE

## 2025-02-06 PROCEDURE — 3046F HEMOGLOBIN A1C LEVEL >9.0%: CPT | Performed by: FAMILY MEDICINE

## 2025-02-06 PROCEDURE — G8427 DOCREV CUR MEDS BY ELIG CLIN: HCPCS | Performed by: FAMILY MEDICINE

## 2025-02-06 PROCEDURE — 1036F TOBACCO NON-USER: CPT | Performed by: FAMILY MEDICINE

## 2025-02-06 PROCEDURE — G2211 COMPLEX E/M VISIT ADD ON: HCPCS | Performed by: FAMILY MEDICINE

## 2025-02-06 SDOH — ECONOMIC STABILITY: FOOD INSECURITY: WITHIN THE PAST 12 MONTHS, YOU WORRIED THAT YOUR FOOD WOULD RUN OUT BEFORE YOU GOT MONEY TO BUY MORE.: NEVER TRUE

## 2025-02-06 SDOH — ECONOMIC STABILITY: FOOD INSECURITY: WITHIN THE PAST 12 MONTHS, THE FOOD YOU BOUGHT JUST DIDN'T LAST AND YOU DIDN'T HAVE MONEY TO GET MORE.: NEVER TRUE

## 2025-02-06 ASSESSMENT — PATIENT HEALTH QUESTIONNAIRE - PHQ9
SUM OF ALL RESPONSES TO PHQ QUESTIONS 1-9: 0
2. FEELING DOWN, DEPRESSED OR HOPELESS: NOT AT ALL
SUM OF ALL RESPONSES TO PHQ QUESTIONS 1-9: 0
SUM OF ALL RESPONSES TO PHQ9 QUESTIONS 1 & 2: 0
1. LITTLE INTEREST OR PLEASURE IN DOING THINGS: NOT AT ALL
SUM OF ALL RESPONSES TO PHQ QUESTIONS 1-9: 0
SUM OF ALL RESPONSES TO PHQ QUESTIONS 1-9: 0

## 2025-02-06 NOTE — PROGRESS NOTES
STAT US scheduled today at [facility] at 3:30pm. Pt is to go to Main Radiology at the hospital for this.

## 2025-02-07 ENCOUNTER — PATIENT MESSAGE (OUTPATIENT)
Dept: FAMILY MEDICINE CLINIC | Age: 60
End: 2025-02-07

## 2025-02-07 DIAGNOSIS — R59.0 PELVIC LYMPHADENOPATHY: ICD-10-CM

## 2025-02-07 DIAGNOSIS — R19.09 MASS OF LEFT INGUINAL REGION: Primary | ICD-10-CM

## 2025-02-10 ENCOUNTER — APPOINTMENT (OUTPATIENT)
Dept: CT IMAGING | Age: 60
End: 2025-02-10
Payer: COMMERCIAL

## 2025-02-10 ENCOUNTER — HOSPITAL ENCOUNTER (EMERGENCY)
Age: 60
Discharge: HOME | End: 2025-02-10
Attending: EMERGENCY MEDICINE
Payer: COMMERCIAL

## 2025-02-10 VITALS
OXYGEN SATURATION: 97 % | HEIGHT: 72 IN | WEIGHT: 315 LBS | HEART RATE: 64 BPM | SYSTOLIC BLOOD PRESSURE: 170 MMHG | TEMPERATURE: 98.4 F | DIASTOLIC BLOOD PRESSURE: 113 MMHG | RESPIRATION RATE: 16 BRPM | BODY MASS INDEX: 42.66 KG/M2

## 2025-02-10 DIAGNOSIS — L02.91 ABSCESS: Primary | ICD-10-CM

## 2025-02-10 LAB
ANION GAP SERPL CALC-SCNC: 13 MEQ/L (ref 8–16)
BASOPHILS ABSOLUTE: 0 THOU/MM3 (ref 0–0.1)
BASOPHILS NFR BLD AUTO: 0.5 %
BUN SERPL-MCNC: 19 MG/DL (ref 7–22)
CALCIUM SERPL-MCNC: 9.1 MG/DL (ref 8.5–10.5)
CHLORIDE SERPL-SCNC: 108 MEQ/L (ref 98–111)
CO2 SERPL-SCNC: 20 MEQ/L (ref 23–33)
CREAT SERPL-MCNC: 0.7 MG/DL (ref 0.4–1.2)
DEPRECATED RDW RBC AUTO: 51.8 FL (ref 35–45)
EOSINOPHIL NFR BLD AUTO: 2.7 %
EOSINOPHILS ABSOLUTE: 0.2 THOU/MM3 (ref 0–0.4)
ERYTHROCYTE [DISTWIDTH] IN BLOOD BY AUTOMATED COUNT: 14.4 % (ref 11.5–14.5)
GFR SERPL CREATININE-BSD FRML MDRD: > 90 ML/MIN/1.73M2
GLUCOSE SERPL-MCNC: 101 MG/DL (ref 70–108)
HCT VFR BLD AUTO: 46.5 % (ref 42–52)
HGB BLD-MCNC: 14.1 GM/DL (ref 14–18)
IMM GRANULOCYTES # BLD AUTO: 0.02 THOU/MM3 (ref 0–0.07)
IMM GRANULOCYTES NFR BLD AUTO: 0.3 %
LYMPHOCYTES ABSOLUTE: 1.7 THOU/MM3 (ref 1–4.8)
LYMPHOCYTES NFR BLD AUTO: 29.2 %
MCH RBC QN AUTO: 29.6 PG (ref 26–33)
MCHC RBC AUTO-ENTMCNC: 30.3 GM/DL (ref 32.2–35.5)
MCV RBC AUTO: 97.5 FL (ref 80–94)
MONOCYTES ABSOLUTE: 0.6 THOU/MM3 (ref 0.4–1.3)
MONOCYTES NFR BLD AUTO: 9.7 %
NEUTROPHILS ABSOLUTE: 3.4 THOU/MM3 (ref 1.8–7.7)
NEUTROPHILS NFR BLD AUTO: 57.6 %
NRBC BLD AUTO-RTO: 0 /100 WBC
OSMOLALITY SERPL CALC.SUM OF ELEC: 283.7 MOSMOL/KG (ref 275–300)
PLATELET # BLD AUTO: 99 THOU/MM3 (ref 130–400)
PMV BLD AUTO: 11.6 FL (ref 9.4–12.4)
POTASSIUM SERPL-SCNC: 4.4 MEQ/L (ref 3.5–5.2)
RBC # BLD AUTO: 4.77 MILL/MM3 (ref 4.7–6.1)
SODIUM SERPL-SCNC: 141 MEQ/L (ref 135–145)
WBC # BLD AUTO: 5.9 THOU/MM3 (ref 4.8–10.8)

## 2025-02-10 PROCEDURE — 74177 CT ABD & PELVIS W/CONTRAST: CPT

## 2025-02-10 PROCEDURE — 85025 COMPLETE CBC W/AUTO DIFF WBC: CPT

## 2025-02-10 PROCEDURE — 36415 COLL VENOUS BLD VENIPUNCTURE: CPT

## 2025-02-10 PROCEDURE — 99285 EMERGENCY DEPT VISIT HI MDM: CPT

## 2025-02-10 PROCEDURE — 80048 BASIC METABOLIC PNL TOTAL CA: CPT

## 2025-02-10 PROCEDURE — 6360000004 HC RX CONTRAST MEDICATION: Performed by: EMERGENCY MEDICINE

## 2025-02-10 PROCEDURE — 54700 I&D EPDIDYMS TSTIS&/SCROT SP: CPT

## 2025-02-10 RX ORDER — CEPHALEXIN 500 MG/1
500 CAPSULE ORAL 4 TIMES DAILY
Qty: 1 CAPSULE | Refills: 0 | Status: SHIPPED | OUTPATIENT
Start: 2025-02-10 | End: 2025-02-17

## 2025-02-10 RX ORDER — SULFAMETHOXAZOLE AND TRIMETHOPRIM 800; 160 MG/1; MG/1
1 TABLET ORAL 2 TIMES DAILY
Qty: 14 TABLET | Refills: 0 | Status: SHIPPED | OUTPATIENT
Start: 2025-02-10 | End: 2025-02-17

## 2025-02-10 RX ORDER — IOPAMIDOL 755 MG/ML
80 INJECTION, SOLUTION INTRAVASCULAR
Status: COMPLETED | OUTPATIENT
Start: 2025-02-10 | End: 2025-02-10

## 2025-02-10 RX ADMIN — IOPAMIDOL 80 ML: 755 INJECTION, SOLUTION INTRAVENOUS at 18:12

## 2025-02-10 ASSESSMENT — ENCOUNTER SYMPTOMS
RESPIRATORY NEGATIVE: 1
ABDOMINAL PAIN: 1

## 2025-02-10 ASSESSMENT — PAIN SCALES - GENERAL: PAINLEVEL_OUTOF10: 5

## 2025-02-10 ASSESSMENT — PAIN - FUNCTIONAL ASSESSMENT: PAIN_FUNCTIONAL_ASSESSMENT: 0-10

## 2025-02-10 ASSESSMENT — PAIN DESCRIPTION - ORIENTATION: ORIENTATION: LEFT

## 2025-02-10 ASSESSMENT — PAIN DESCRIPTION - LOCATION: LOCATION: LEG

## 2025-02-10 NOTE — ED PROVIDER NOTES
[facility] EMERGENCY DEPARTMENT      EMERGENCY MEDICINE     Pt Name: Fernando Green Jr.  MRN: [id number]  Birthdate 1965  Date of evaluation: 2/10/2025  Resident Physician: Antonio Rios DPM  Supervising Physician: Mayo Davey DO     CHIEF COMPLAINT       Chief Complaint   Patient presents with    Abscess     HISTORY OF PRESENT ILLNESS   Fernando Green Jr. is a pleasant 59 y.o. male who presents to the emergency department from from home, by private vehicle for evaluation of potential abscess.  Patient stated that he noticed he had some a little abnormal bump in his left groin area about a week ago popped and drained underneath the skin and then 2 days later had some of the size of golf ball.  Patient said that he had seen his primary care doctor and they got an ultrasound to make sure that it was not a hernia his primary care physician said the consistency felt that it could be something different.  Patient was watching TV and noticed to bad smell and looked down and noticed that there was blood with a mixture of brown fluid that had drained from it.  Patient came to the emergency department by way of recommendation of his family care doctor for evaluation of the abscess.  Patient denies nausea, fever, diarrhea, shortness of breath no other complaints    PASTMEDICAL HISTORY     Past Medical History:   Diagnosis Date    History of meniscal tear     right knee-takes Naproxen    Hyperlipidemia     Hypertension     Obesity     Seasonal allergies        Patient Active Problem List   Diagnosis Code    CAMI on CPAP G47.33    Morbid obesity E66.01    IFG (impaired fasting glucose) R73.01    Hyperlipidemia with target LDL less than 100 E78.5    Renal cyst N28.1    Primary insomnia F51.01    Finding of above normal blood pressure R03.0     SURGICAL HISTORY       Past Surgical History:   Procedure Laterality Date    CHOLECYSTECTOMY, LAPAROSCOPIC N/A 10/26/2020    LAP CHOLECYSTECTOMY performed by Colten Spence DO

## 2025-02-10 NOTE — ED TRIAGE NOTES
Pt presents to ED with chief complaint of abscess to left upper leg/groin. Pt states he first noticed it on Friday and saw his PCP and had an ultrasound done to rule out hernia. Pt states on Saturday, he started noticing drainage from the site and swelling / pain.

## 2025-02-11 NOTE — DISCHARGE INSTRUCTIONS
Take your medication as indicated and prescribed.  If you are given an antibiotic then, make sure you get the prescription filled and take the antibiotics until finished.  Drink plenty of water while taking the antibiotics.  Avoid drinking alcohol or drinks that have caffeine in it while taking antibiotics.       For pain use acetaminophen (Tylenol) or ibuprofen (Motrin / Advil), unless prescribed medications that have acetaminophen or ibuprofen (or similar medications) in it.  You can take over the counter acetaminophen tablets (1 - 2 tablets of the 500-mg strength every 6 hours) or ibuprofen tablets (2 tablets every 4 hours).    PLEASE RETURN TO THE EMERGENCY DEPARTMENT IMMEDIATELY for worsening symptoms, white drainage from the wound, redness or streaking, or if you develop any concerning symptoms such as: high fever not relieved by acetaminophen (Tylenol) and/or ibuprofen (Motrin / Advil), chills, shortness of breath, chest pain, feeling of your heart fluttering or racing, persistent nausea and/or vomiting, vomiting up blood, blood in your stool, loss of consciousness, numbness, weakness or tingling in the arms or legs or change in color of the extremities, changes in mental status, persistent headache, blurry vision, loss of bladder / bowel control, unable to follow up with your physician, or other any other care or concern.

## 2025-02-13 ENCOUNTER — OFFICE VISIT (OUTPATIENT)
Dept: FAMILY MEDICINE CLINIC | Age: 60
End: 2025-02-13
Payer: COMMERCIAL

## 2025-02-13 VITALS
BODY MASS INDEX: 48.99 KG/M2 | DIASTOLIC BLOOD PRESSURE: 70 MMHG | SYSTOLIC BLOOD PRESSURE: 138 MMHG | RESPIRATION RATE: 20 BRPM | HEART RATE: 68 BPM | WEIGHT: 315 LBS

## 2025-02-13 DIAGNOSIS — L02.214 ABSCESS OF LEFT GROIN: Primary | ICD-10-CM

## 2025-02-13 PROCEDURE — G8417 CALC BMI ABV UP PARAM F/U: HCPCS | Performed by: FAMILY MEDICINE

## 2025-02-13 PROCEDURE — G2211 COMPLEX E/M VISIT ADD ON: HCPCS | Performed by: FAMILY MEDICINE

## 2025-02-13 PROCEDURE — G8427 DOCREV CUR MEDS BY ELIG CLIN: HCPCS | Performed by: FAMILY MEDICINE

## 2025-02-13 PROCEDURE — 1036F TOBACCO NON-USER: CPT | Performed by: FAMILY MEDICINE

## 2025-02-13 PROCEDURE — 3017F COLORECTAL CA SCREEN DOC REV: CPT | Performed by: FAMILY MEDICINE

## 2025-02-13 PROCEDURE — 99213 OFFICE O/P EST LOW 20 MIN: CPT | Performed by: FAMILY MEDICINE

## 2025-02-13 NOTE — PROGRESS NOTES
Fernando Green Jr. (:  1965) is a 59 y.o. male,Established patient, here for evaluation of the following chief complaint(s):  Follow-Up from Hospital          Subjective   SUBJECTIVE/OBJECTIVE:  HPI  Chief Complaint   Patient presents with    Follow-Up from Hospital     CHIEF COMPLAINT            Chief Complaint   Patient presents with    Abscess      HISTORY OF PRESENT ILLNESS   Fernando Green Jr. is a pleasant 59 y.o. male who presents to the emergency department from from home, by private vehicle for evaluation of potential abscess.  Patient stated that he noticed he had some a little abnormal bump in his left groin area about a week ago popped and drained underneath the skin and then 2 days later had some of the size of golf ball.  Patient said that he had seen his primary care doctor and they got an ultrasound to make sure that it was not a hernia his primary care physician said the consistency felt that it could be something different.  Patient was watching TV and noticed to bad smell and looked down and noticed that there was blood with a mixture of brown fluid that had drained from it.  Patient came to the emergency department by way of recommendation of his family care doctor for evaluation of the abscess.  Patient denies nausea, fever, diarrhea, shortness of breath no other complaints      Patient Active Problem List   Diagnosis    CAMI on CPAP    Morbid obesity    IFG (impaired fasting glucose)    Hyperlipidemia with target LDL less than 100    Renal cyst    Primary insomnia    Finding of above normal blood pressure       Current Outpatient Medications   Medication Sig Dispense Refill    cephALEXin (KEFLEX) 500 MG capsule Take 1 capsule by mouth 4 times daily for 7 days 1 capsule 0    sulfamethoxazole-trimethoprim (BACTRIM DS;SEPTRA DS) 800-160 MG per tablet Take 1 tablet by mouth 2 times daily for 7 days 14 tablet 0    rosuvastatin (CRESTOR) 20 MG tablet TAKE 1 TABLET BY MOUTH EVERYDAY AT BEDTIME 90

## 2025-02-14 ASSESSMENT — ENCOUNTER SYMPTOMS
GASTROINTESTINAL NEGATIVE: 1
RESPIRATORY NEGATIVE: 1
ROS SKIN COMMENTS: GROIN ABSCESS

## 2025-02-17 ENCOUNTER — PATIENT MESSAGE (OUTPATIENT)
Dept: FAMILY MEDICINE CLINIC | Age: 60
End: 2025-02-17

## 2025-02-17 DIAGNOSIS — E11.9 CONTROLLED TYPE 2 DIABETES MELLITUS WITHOUT COMPLICATION, WITHOUT LONG-TERM CURRENT USE OF INSULIN (HCC): ICD-10-CM

## 2025-02-17 RX ORDER — TIRZEPATIDE 10 MG/.5ML
10 INJECTION, SOLUTION SUBCUTANEOUS WEEKLY
Qty: 2 ML | Refills: 1 | Status: SHIPPED | OUTPATIENT
Start: 2025-02-17

## 2025-02-18 ENCOUNTER — TELEPHONE (OUTPATIENT)
Dept: FAMILY MEDICINE CLINIC | Age: 60
End: 2025-02-18

## 2025-02-18 NOTE — TELEPHONE ENCOUNTER
Patient called back and informed. He verbalized understanding. He will check with pharmacy later today/ tomorrow for prescription

## 2025-02-18 NOTE — TELEPHONE ENCOUNTER
Patient called in to the office. Stated that he received a message from the pharmacy on [facility], stating that the doctors office had not supplied the information needed for this medication and for patient to contact the office for more information.   I told him we would check into it and return his call.     Call placed to [facility]. Spoke to Collins in the pharmacy. She stated that the mounajaro is in process. Doesn't show there are any issues or PA needed. Total cost for patient is $100/box. She showed he has had to pay with the past prescriptions also.     Attempted to contact patient to notify. No vm available.

## 2025-03-05 LAB
ALBUMIN: 3.7 G/DL (ref 3.5–5.2)
ALK PHOSPHATASE: 141 U/L (ref 39–118)
ALT SERPL-CCNC: 54 U/L (ref 5–41)
ANION GAP SERPL CALCULATED.3IONS-SCNC: 14 MMOL/L (ref 7–16)
AST SERPL-CCNC: 72 U/L (ref 9–50)
BASOPHILS ABSOLUTE: 0.03 K/UL (ref 0–0.2)
BASOPHILS RELATIVE PERCENT: 0.5 % (ref 0–2)
BILIRUB SERPL-MCNC: 0.9 MG/DL
BUN BLDV-MCNC: 20 MG/DL (ref 6–20)
CALCIUM SERPL-MCNC: 9.2 MG/DL (ref 8.6–10.5)
CHLORIDE BLD-SCNC: 108 MMOL/L (ref 96–107)
CHOLESTEROL, TOTAL: 136 MG/DL (ref 100–199)
CHOLESTEROL/HDL RATIO: 2.5 (ref 2–4.5)
CO2: 21 MMOL/L (ref 18–32)
CREAT SERPL-MCNC: 0.8 MG/DL (ref 0.67–1.3)
EGFR IF NONAFRICAN AMERICAN: 103 ML/MIN/1.73M2
EOSINOPHILS ABSOLUTE: 0.2 K/UL (ref 0–0.8)
EOSINOPHILS RELATIVE PERCENT: 3.4 % (ref 0–5)
ESTIMATED AVERAGE GLUCOSE: 117 MG/DL
GLUCOSE: 111 MG/DL (ref 70–100)
HBA1C MFR BLD: 5.7 %
HCT VFR BLD CALC: 45.5 % (ref 39–52)
HDLC SERPL-MCNC: 55 MG/DL
HEMOGLOBIN: 14.7 G/DL (ref 13–18)
IMMATURE GRANS (ABS): 0.02 K/UL (ref 0–0.06)
IMMATURE GRANULOCYTES %: 0.3 % (ref 0–2)
LDL CHOLESTEROL: 68 MG/DL
LDL/HDL RATIO: 1.2
LYMPHOCYTES ABSOLUTE: 2.22 K/UL (ref 0.9–5.2)
LYMPHOCYTES RELATIVE PERCENT: 37.5 % (ref 20–45)
MCH RBC QN AUTO: 29.4 PG (ref 26–32)
MCHC RBC AUTO-ENTMCNC: 32.3 G/DL (ref 32–35)
MCV RBC AUTO: 91 FL (ref 75–100)
MONOCYTES ABSOLUTE: 0.51 K/UL (ref 0.1–1)
MONOCYTES RELATIVE PERCENT: 8.6 % (ref 0–13)
NEUTROPHILS ABSOLUTE: 2.94 K/UL (ref 1.9–8)
NEUTROPHILS RELATIVE PERCENT: 49.7 % (ref 45–75)
PDW BLD-RTO: 13.1 % (ref 11.2–14.8)
PLATELET # BLD: 115 THOUS/CMM (ref 140–440)
POTASSIUM SERPL-SCNC: 4.1 MMOL/L (ref 3.5–5.4)
PSA, ULTRASENSITIVE: 0.45 NG/ML (ref 0–4)
RBC # BLD: 5 MILL/CMM (ref 4.4–6.1)
SODIUM BLD-SCNC: 143 MMOL/L (ref 135–148)
TOTAL PROTEIN: 6.5 G/DL (ref 6–8.3)
TRIGL SERPL-MCNC: 65 MG/DL (ref 20–149)
TSH SERPL DL<=0.05 MIU/L-ACNC: 2.02 UIU/ML (ref 0.27–4.2)
VLDLC SERPL CALC-MCNC: 13 MG/DL
WBC # BLD: 5.9 THDS/CMM (ref 3.6–11)

## 2025-03-25 DIAGNOSIS — E11.9 CONTROLLED TYPE 2 DIABETES MELLITUS WITHOUT COMPLICATION, WITHOUT LONG-TERM CURRENT USE OF INSULIN: ICD-10-CM

## 2025-03-25 DIAGNOSIS — I10 HYPERTENSION, UNSPECIFIED TYPE: ICD-10-CM

## 2025-03-25 RX ORDER — LISINOPRIL 10 MG/1
10 TABLET ORAL DAILY
Qty: 90 TABLET | Refills: 3 | Status: SHIPPED | OUTPATIENT
Start: 2025-03-25

## 2025-03-25 RX ORDER — TIRZEPATIDE 2.5 MG/.5ML
INJECTION, SOLUTION SUBCUTANEOUS
OUTPATIENT
Start: 2025-03-25

## 2025-04-17 DIAGNOSIS — E11.9 CONTROLLED TYPE 2 DIABETES MELLITUS WITHOUT COMPLICATION, WITHOUT LONG-TERM CURRENT USE OF INSULIN (HCC): ICD-10-CM

## 2025-04-17 RX ORDER — TIRZEPATIDE 2.5 MG/.5ML
INJECTION, SOLUTION SUBCUTANEOUS
OUTPATIENT
Start: 2025-04-17

## 2025-05-15 DIAGNOSIS — E11.9 CONTROLLED TYPE 2 DIABETES MELLITUS WITHOUT COMPLICATION, WITHOUT LONG-TERM CURRENT USE OF INSULIN (HCC): ICD-10-CM

## 2025-05-16 RX ORDER — TIRZEPATIDE 10 MG/.5ML
INJECTION, SOLUTION SUBCUTANEOUS
Qty: 2 ML | Refills: 2 | Status: SHIPPED | OUTPATIENT
Start: 2025-05-16

## 2025-05-16 RX ORDER — TIRZEPATIDE 2.5 MG/.5ML
INJECTION, SOLUTION SUBCUTANEOUS
OUTPATIENT
Start: 2025-05-16

## 2025-07-08 DIAGNOSIS — E11.9 CONTROLLED TYPE 2 DIABETES MELLITUS WITHOUT COMPLICATION, WITHOUT LONG-TERM CURRENT USE OF INSULIN (HCC): ICD-10-CM

## 2025-07-08 RX ORDER — TIRZEPATIDE 12.5 MG/.5ML
12.5 INJECTION, SOLUTION SUBCUTANEOUS WEEKLY
Qty: 2 ML | Refills: 2 | Status: SHIPPED | OUTPATIENT
Start: 2025-07-08

## 2025-07-08 RX ORDER — TIRZEPATIDE 10 MG/.5ML
INJECTION, SOLUTION SUBCUTANEOUS
Qty: 2 ML | Refills: 2 | Status: CANCELLED | OUTPATIENT
Start: 2025-07-08

## (undated) DEVICE — TROCAR: Brand: KII FIOS FIRST ENTRY

## (undated) DEVICE — Z DUPLICATE USE 2431315 SET INSUF TBNG HI FLO W/ SMK EVAC FOR PNEUMOCLEAR

## (undated) DEVICE — SOLUTION ANTIFOG VIS SYS CLEARIFY LAPSCP

## (undated) DEVICE — CATHETER CHOLGM 4.5FR L18IN TIP 5.5FR W/ MTL SUPP TB TAUT

## (undated) DEVICE — UNIVERSAL PLUS MULTIFUNCTION INSTRUMENT SYSTEM (STRAIGHT GRIP HANDLE), STRAIGHT HANDLE ELECTROSURGICAL SUCTION/IRRIGATION INSTRUMENT WITH HAND CONTROLLED ELECTROSURGERY (BUTTONS): Brand: UNIVERSAL PLUS

## (undated) DEVICE — GENERAL LAPAROSCOPY PACK-LF: Brand: MEDLINE INDUSTRIES, INC.

## (undated) DEVICE — APPLIER CLP M L L11.4IN DIA10MM ENDOSCP ROT MULT FOR LIG

## (undated) DEVICE — INTRODUCER CATH L3.5IN DIA7.5FR FOR CHOLANGIOGRAPHY TAUT

## (undated) DEVICE — BAG SPEC REM 224ML W4XL6IN DIA10MM 1 HND GYN DISP ENDOPCH

## (undated) DEVICE — UNIVERSAL PLUS LAPAROSCOPIC ELECTRODE WITH SUCTION/IRRIGATION, SPATULA 5 MM X 32 CM: Brand: UNIVERSAL PLUS